# Patient Record
Sex: MALE | Race: WHITE | Employment: FULL TIME | URBAN - METROPOLITAN AREA
[De-identification: names, ages, dates, MRNs, and addresses within clinical notes are randomized per-mention and may not be internally consistent; named-entity substitution may affect disease eponyms.]

---

## 2020-09-25 ENCOUNTER — APPOINTMENT (OUTPATIENT)
Dept: CT IMAGING | Age: 55
End: 2020-09-25
Attending: EMERGENCY MEDICINE
Payer: COMMERCIAL

## 2020-09-25 ENCOUNTER — HOSPITAL ENCOUNTER (EMERGENCY)
Age: 55
Discharge: HOME | End: 2020-09-25
Attending: EMERGENCY MEDICINE
Payer: COMMERCIAL

## 2020-09-25 ENCOUNTER — APPOINTMENT (OUTPATIENT)
Dept: GENERAL RADIOLOGY | Age: 55
End: 2020-09-25
Attending: EMERGENCY MEDICINE
Payer: COMMERCIAL

## 2020-09-25 VITALS
WEIGHT: 260 LBS | HEIGHT: 73 IN | RESPIRATION RATE: 20 BRPM | BODY MASS INDEX: 34.46 KG/M2 | TEMPERATURE: 98 F | HEART RATE: 63 BPM | OXYGEN SATURATION: 99 % | DIASTOLIC BLOOD PRESSURE: 82 MMHG | SYSTOLIC BLOOD PRESSURE: 171 MMHG

## 2020-09-25 DIAGNOSIS — R07.9 CHEST PAIN, UNSPECIFIED TYPE: Primary | ICD-10-CM

## 2020-09-25 DIAGNOSIS — R91.1 PULMONARY NODULE: ICD-10-CM

## 2020-09-25 DIAGNOSIS — R07.89 CHEST WALL PAIN: ICD-10-CM

## 2020-09-25 DIAGNOSIS — K21.9 GASTROESOPHAGEAL REFLUX DISEASE WITHOUT ESOPHAGITIS: ICD-10-CM

## 2020-09-25 LAB
ALBUMIN SERPL-MCNC: 3.8 G/DL (ref 3.5–5)
ALBUMIN/GLOB SERPL: 1.1 {RATIO} (ref 1.1–2.2)
ALP SERPL-CCNC: 98 U/L (ref 45–117)
ALT SERPL-CCNC: 26 U/L (ref 12–78)
ANION GAP SERPL CALC-SCNC: 5 MMOL/L (ref 5–15)
AST SERPL W P-5'-P-CCNC: 13 U/L (ref 15–37)
ATRIAL RATE: 64 BPM
BASOPHILS # BLD: 0 K/UL (ref 0–0.1)
BASOPHILS NFR BLD: 0 % (ref 0–1)
BILIRUB SERPL-MCNC: 0.3 MG/DL (ref 0.2–1)
BNP SERPL-MCNC: 85 PG/ML
BUN SERPL-MCNC: 15 MG/DL (ref 6–20)
BUN/CREAT SERPL: 18 (ref 12–20)
CA-I BLD-MCNC: 9 MG/DL (ref 8.5–10.1)
CALCULATED P AXIS, ECG09: 5 DEGREES
CALCULATED R AXIS, ECG10: 14 DEGREES
CALCULATED T AXIS, ECG11: 38 DEGREES
CHLORIDE SERPL-SCNC: 109 MMOL/L (ref 97–108)
CO2 SERPL-SCNC: 26 MMOL/L (ref 21–32)
CREAT SERPL-MCNC: 0.84 MG/DL (ref 0.7–1.3)
DIAGNOSIS, 93000: NORMAL
DIFFERENTIAL METHOD BLD: NORMAL
EOSINOPHIL # BLD: 0.1 K/UL (ref 0–0.4)
EOSINOPHIL NFR BLD: 2 % (ref 0–7)
ERYTHROCYTE [DISTWIDTH] IN BLOOD BY AUTOMATED COUNT: 13.3 % (ref 11.5–14.5)
GLOBULIN SER CALC-MCNC: 3.5 G/DL (ref 2–4)
GLUCOSE SERPL-MCNC: 110 MG/DL (ref 65–100)
HCT VFR BLD AUTO: 43.1 % (ref 36.6–50.3)
HGB BLD-MCNC: 14.7 % (ref 12.1–17)
IMM GRANULOCYTES # BLD AUTO: 0 K/UL (ref 0–0.04)
IMM GRANULOCYTES NFR BLD AUTO: 0 % (ref 0–0.5)
LYMPHOCYTES # BLD: 1.3 K/UL (ref 0.8–3.5)
LYMPHOCYTES NFR BLD: 18 % (ref 12–49)
MCH RBC QN AUTO: 30.4 PG (ref 26–34)
MCHC RBC AUTO-ENTMCNC: 34.1 G/DL (ref 30–36.5)
MCV RBC AUTO: 89 FL (ref 80–99)
MONOCYTES # BLD: 0.4 K/UL (ref 0–1)
MONOCYTES NFR BLD: 6 % (ref 5–13)
NEUTS SEG # BLD: 5.1 K/UL (ref 1.8–8)
NEUTS SEG NFR BLD: 74 % (ref 32–75)
P-R INTERVAL, ECG05: 146 MS
PLATELET # BLD AUTO: 249 K/UL (ref 150–400)
PMV BLD AUTO: 11.8 FL (ref 8.9–12.9)
POTASSIUM SERPL-SCNC: 3.9 MMOL/L (ref 3.5–5.1)
PROT SERPL-MCNC: 7.3 G/DL (ref 6.4–8.2)
Q-T INTERVAL, ECG07: 422 MS
QRS DURATION, ECG06: 106 MS
QTC CALCULATION (BEZET), ECG08: 435 MS
RBC # BLD AUTO: 4.84 M/UL (ref 4.1–5.7)
SODIUM SERPL-SCNC: 140 MMOL/L (ref 136–145)
TROPONIN I SERPL-MCNC: <0.05 NG/ML
TROPONIN I SERPL-MCNC: <0.05 NG/ML
VENTRICULAR RATE, ECG03: 64 BPM
WBC # BLD AUTO: 6.8 K/UL (ref 4.1–11.1)

## 2020-09-25 PROCEDURE — 83880 ASSAY OF NATRIURETIC PEPTIDE: CPT

## 2020-09-25 PROCEDURE — 80053 COMPREHEN METABOLIC PANEL: CPT

## 2020-09-25 PROCEDURE — 36415 COLL VENOUS BLD VENIPUNCTURE: CPT

## 2020-09-25 PROCEDURE — 93005 ELECTROCARDIOGRAM TRACING: CPT

## 2020-09-25 PROCEDURE — 74011000636 HC RX REV CODE- 636: Performed by: EMERGENCY MEDICINE

## 2020-09-25 PROCEDURE — 99284 EMERGENCY DEPT VISIT MOD MDM: CPT

## 2020-09-25 PROCEDURE — 85025 COMPLETE CBC W/AUTO DIFF WBC: CPT

## 2020-09-25 PROCEDURE — 71275 CT ANGIOGRAPHY CHEST: CPT

## 2020-09-25 PROCEDURE — 71045 X-RAY EXAM CHEST 1 VIEW: CPT

## 2020-09-25 PROCEDURE — 84484 ASSAY OF TROPONIN QUANT: CPT

## 2020-09-25 RX ORDER — FAMOTIDINE 20 MG/1
20 TABLET, FILM COATED ORAL 2 TIMES DAILY
Qty: 20 TAB | Refills: 0 | Status: SHIPPED | OUTPATIENT
Start: 2020-09-25 | End: 2020-10-05

## 2020-09-25 RX ORDER — ASPIRIN 325 MG
325 TABLET ORAL
Status: DISCONTINUED | OUTPATIENT
Start: 2020-09-25 | End: 2020-09-25 | Stop reason: HOSPADM

## 2020-09-25 RX ORDER — TRAMADOL HYDROCHLORIDE 50 MG/1
50 TABLET ORAL
Qty: 12 TAB | Refills: 0 | Status: SHIPPED | OUTPATIENT
Start: 2020-09-25 | End: 2020-09-28

## 2020-09-25 RX ADMIN — IOPAMIDOL 100 ML: 755 INJECTION, SOLUTION INTRAVENOUS at 12:30

## 2020-09-25 NOTE — DISCHARGE INSTRUCTIONS
Patient Education        Chest Pain: Care Instructions  Your Care Instructions     There are many things that can cause chest pain. Some are not serious and will get better on their own in a few days. But some kinds of chest pain need more testing and treatment. Your doctor may have recommended a follow-up visit in the next 8 to 12 hours. If you are not getting better, you may need more tests or treatment. Even though your doctor has released you, you still need to watch for any problems. The doctor carefully checked you, but sometimes problems can develop later. If you have new symptoms or if your symptoms do not get better, get medical care right away. If you have worse or different chest pain or pressure that lasts more than 5 minutes or you passed out (lost consciousness), call 911 or seek other emergency help right away. A medical visit is only one step in your treatment. Even if you feel better, you still need to do what your doctor recommends, such as going to all suggested follow-up appointments and taking medicines exactly as directed. This will help you recover and help prevent future problems. How can you care for yourself at home? · Rest until you feel better. · Take your medicine exactly as prescribed. Call your doctor if you think you are having a problem with your medicine. · Do not drive after taking a prescription pain medicine. When should you call for help? Call 911 if:     · You passed out (lost consciousness).     · You have severe difficulty breathing.     · You have symptoms of a heart attack. These may include:  ? Chest pain or pressure, or a strange feeling in your chest.  ? Sweating. ? Shortness of breath. ? Nausea or vomiting. ? Pain, pressure, or a strange feeling in your back, neck, jaw, or upper belly or in one or both shoulders or arms. ? Lightheadedness or sudden weakness. ? A fast or irregular heartbeat.   After you call 911, the  may tell you to chew 1 adult-strength or 2 to 4 low-dose aspirin. Wait for an ambulance. Do not try to drive yourself. Call your doctor today if:     · You have any trouble breathing.     · Your chest pain gets worse.     · You are dizzy or lightheaded, or you feel like you may faint.     · You are not getting better as expected.     · You are having new or different chest pain. Where can you learn more? Go to http://www.nava.com/  Enter A120 in the search box to learn more about \"Chest Pain: Care Instructions. \"  Current as of: June 26, 2019               Content Version: 12.6  © 5833-1982 [employer]. Care instructions adapted under license by [employer] (which disclaims liability or warranty for this information). If you have questions about a medical condition or this instruction, always ask your healthcare professional. [address] any warranty or liability for your use of this information. Patient Education        Gastroesophageal Reflux Disease (GERD): Care Instructions  Overview     Gastroesophageal reflux disease (GERD) is the backward flow of stomach acid into the esophagus. The esophagus is the tube that leads from your throat to your stomach. A one-way valve prevents the stomach acid from backing up into this tube. But when you have GERD, this valve does not close tightly enough. This can also cause pain and swelling in your esophagus. (This is called esophagitis.)  If you have mild GERD symptoms including heartburn, you may be able to control the problem with antacids or over-the-counter medicine. You can also make lifestyle changes to help reduce your symptoms. These include changing your diet and eating habits, such as not eating late at night and losing weight. Follow-up care is a key part of your treatment and safety. Be sure to make and go to all appointments, and call your doctor if you are having problems.  It's also a good idea to know your test results and keep a list of the medicines you take. How can you care for yourself at home? · Take your medicines exactly as prescribed. Call your doctor if you think you are having a problem with your medicine. · Your doctor may recommend over-the-counter medicine. For mild or occasional indigestion, antacids, such as Tums, Gaviscon, Mylanta, or Maalox, may help. Your doctor also may recommend over-the-counter acid reducers, such as famotidine (Pepcid AC), cimetidine (Tagamet HB), or omeprazole (Prilosec). Read and follow all instructions on the label. If you use these medicines often, talk with your doctor. · Change your eating habits. ? It's best to eat several small meals instead of two or three large meals. ? After you eat, wait 2 to 3 hours before you lie down. ? Chocolate, mint, and alcohol can make GERD worse. ? Spicy foods, foods that have a lot of acid (like tomatoes and oranges), and coffee can make GERD symptoms worse in some people. If your symptoms are worse after you eat a certain food, you may want to stop eating that food to see if your symptoms get better. · Do not smoke or chew tobacco. Smoking can make GERD worse. If you need help quitting, talk to your doctor about stop-smoking programs and medicines. These can increase your chances of quitting for good. · If you have GERD symptoms at night, raise the head of your bed 6 to 8 inches by putting the frame on blocks or placing a foam wedge under the head of your mattress. (Adding extra pillows does not work.)  · Do not wear tight clothing around your middle. · Lose weight if you need to. Losing just 5 to 10 pounds can help. When should you call for help? Call your doctor now or seek immediate medical care if:    · You have new or different belly pain.     · Your stools are black and tarlike or have streaks of blood.    Watch closely for changes in your health, and be sure to contact your doctor if:    · Your symptoms have not improved after 2 days.     · Food seems to catch in your throat or chest.   Where can you learn more? Go to http://tima-radha.info/  Enter N776 in the search box to learn more about \"Gastroesophageal Reflux Disease (GERD): Care Instructions. \"  Current as of: April 15, 2020               Content Version: 12.6  © 3239-0296 [employer]. Care instructions adapted under license by [employer] (which disclaims liability or warranty for this information). If you have questions about a medical condition or this instruction, always ask your healthcare professional. [place] [address] any warranty or liability for your use of this information. Patient Education        Learning About Lung Nodules  What is a lung nodule? A lung nodule is a growth in the lung. A single nodule surrounded by lung tissue is called a solitary pulmonary nodule. A lung nodule might not cause any symptoms. Your doctor may have found one or more nodules on your lung when you were having a chest X-ray or CT scan. Or it may have been found during a lung cancer screening. A lung nodule may be caused by an old infection or cancer. It might also be a noncancerous growth. Lung nodules can cause a screening to give an abnormal result. Most nodules do not cause any harm. But without further tests, your doctor can't tell whether an abnormal finding is cancer, a harmless nodule, or something else. What can you expect when you have a lung nodule? Your doctor will look at several risk factors to see how likely it is that the nodule is cancer. He or she will look at:  · Whether you smoke or have ever smoked. · Your age and your family's medical history. · Whether you have ever had lung cancer. · The size, density, and other characteristics of the nodule. · Whether the nodule has changed in size.  Your doctor may look at past chest X-rays or CT scans, if available, and compare them. Or you may have a series of CT scans to see if the nodule grows over time. What happens next depends on the risk of the nodule being cancer. · If you have no risk factors and the nodule is small, your doctor may advise doing nothing. · If the risk is small, your doctor may schedule follow-up appointments and CT scans later to see if the nodule is growing. · If there's a higher risk of cancer, your doctor may:  ? Do a PET scan, which may help tell if the nodule is cancerous or not. ? Take a sample of tissue from the nodule for testing. This is called a biopsy. ? Remove the nodule with surgery. Follow-up care is a key part of your treatment and safety. Be sure to make and go to all appointments, and call your doctor if you are having problems. It's also a good idea to know your test results and keep a list of the medicines you take. Where can you learn more? Go to http://www.gray.com/  Enter G449 in the search box to learn more about \"Learning About Lung Nodules. \"  Current as of: April 29, 2020               Content Version: 12.6  © 3009-0270 [employer]. Care instructions adapted under license by [employer] (which disclaims liability or warranty for this information). If you have questions about a medical condition or this instruction, always ask your healthcare professional. [address] any warranty or liability for your use of this information.

## 2020-09-25 NOTE — ED PROVIDER NOTES
EMERGENCY DEPARTMENT HISTORY AND PHYSICAL EXAM      Date: 9/25/2020  Patient Name: Robert Lira. History of Presenting Illness     Chief Complaint   Patient presents with    Chest Pain       History Provided By: Patient    HPI: Robert Gray, 50 y.o. male with a past medical history significant hypertension and myocardial infarction presents to the ED with chief complaint of Chest Pain  . Patient's been having order chest pain and substernal chest burning discomfort. The burning discomfort happens only when he exerts himself. He has been having these intermittent episodes of the last 2 weeks. They seem to be worsening not necessary in intensity or severity. He cannot qualify why it is worsening. He has been compliant on his medication. Take 800 mg of aspirin this morning. No leg pain or swelling. No nausea or vomiting. Denies any discomfort now as he does not want medications          There are no other complaints, changes, or physical findings at this time. PCP: Claudio Jimenez MD    Current Facility-Administered Medications   Medication Dose Route Frequency Provider Last Rate Last Dose    aspirin tablet 325 mg  325 mg Oral NOW Karlene Joseph MD         Current Outpatient Medications   Medication Sig Dispense Refill    traMADoL (Ultram) 50 mg tablet Take 1 Tab by mouth every six (6) hours as needed for Pain for up to 3 days. Max Daily Amount: 200 mg. 12 Tab 0    famotidine (Pepcid) 20 mg tablet Take 1 Tab by mouth two (2) times a day for 10 days. 20 Tab 0       Past History     Past Medical History:  Past Medical History:   Diagnosis Date    CAD (coronary artery disease)     Hypertension        Past Surgical History:  History reviewed. No pertinent surgical history. Family History:  History reviewed. No pertinent family history.     Social History:  Social History     Tobacco Use    Smoking status: Current Every Day Smoker    Smokeless tobacco: Never Used   Substance Use Topics  Alcohol use: Not on file    Drug use: Not on file       Allergies:  No Known Allergies      Review of Systems   Review of Systems   Constitutional: Negative. Negative for chills, fatigue and fever. HENT: Negative. Negative for congestion, ear pain, nosebleeds and sore throat. Eyes: Negative. Negative for pain, discharge and visual disturbance. Respiratory: Positive for chest tightness. Negative for cough and shortness of breath. Cardiovascular: Positive for chest pain. Negative for leg swelling. Gastrointestinal: Negative. Negative for abdominal pain, blood in stool, constipation, diarrhea, nausea and vomiting. Endocrine: Negative. Genitourinary: Negative. Negative for difficulty urinating, dysuria and flank pain. Musculoskeletal: Negative. Negative for back pain and myalgias. Skin: Negative. Negative for rash and wound. Allergic/Immunologic: Negative. Neurological: Negative. Negative for dizziness, syncope, weakness, numbness and headaches. Hematological: Negative. Does not bruise/bleed easily. Psychiatric/Behavioral: Negative. Negative for agitation, confusion, hallucinations and suicidal ideas. All other systems reviewed and are negative. Physical Exam   Physical Exam  Vitals signs and nursing note reviewed. Constitutional:       General: He is not in acute distress. Appearance: He is normal weight. He is not ill-appearing. HENT:      Head: Normocephalic and atraumatic. Right Ear: External ear normal.      Left Ear: External ear normal.      Nose: Nose normal. No rhinorrhea. Mouth/Throat:      Mouth: Mucous membranes are moist.      Pharynx: Oropharynx is clear. Eyes:      Extraocular Movements: Extraocular movements intact. Conjunctiva/sclera: Conjunctivae normal.      Pupils: Pupils are equal, round, and reactive to light. Neck:      Musculoskeletal: Normal range of motion and neck supple.    Cardiovascular:      Rate and Rhythm: Normal rate and regular rhythm. Pulses: Normal pulses. Heart sounds: Normal heart sounds. Pulmonary:      Effort: Pulmonary effort is normal. No respiratory distress. Breath sounds: Normal breath sounds. Abdominal:      General: Abdomen is flat. Bowel sounds are normal.      Palpations: Abdomen is soft. Musculoskeletal: Normal range of motion. General: No tenderness or deformity. Skin:     General: Skin is warm and dry. Capillary Refill: Capillary refill takes less than 2 seconds. Findings: No bruising, lesion or rash. Neurological:      General: No focal deficit present. Mental Status: He is alert and oriented to person, place, and time. Mental status is at baseline. Psychiatric:         Mood and Affect: Mood normal.         Behavior: Behavior normal.         Thought Content:  Thought content normal.         Judgment: Judgment normal.         Diagnostic Study Results     Labs -     Recent Results (from the past 12 hour(s))   EKG, 12 LEAD, INITIAL    Collection Time: 09/25/20  7:37 AM   Result Value Ref Range    Ventricular Rate 64 BPM    Atrial Rate 64 BPM    P-R Interval 146 ms    QRS Duration 106 ms    Q-T Interval 422 ms    QTC Calculation (Bezet) 435 ms    Calculated P Axis 5 degrees    Calculated R Axis 14 degrees    Calculated T Axis 38 degrees    Diagnosis       Normal sinus rhythm  Normal ECG  No previous ECGs available  Confirmed by Tramaine Gallego ([id number]) on 9/25/2020 8:11:48 AM     CBC WITH AUTOMATED DIFF    Collection Time: 09/25/20  8:15 AM   Result Value Ref Range    WBC 6.8 4.1 - 11.1 K/uL    RBC 4.84 4.10 - 5.70 M/uL    HGB 14.7 12.1 - 17.0 %    HCT 43.1 36.6 - 50.3 %    MCV 89.0 80.0 - 99.0 FL    MCH 30.4 26.0 - 34.0 PG    MCHC 34.1 30.0 - 36.5 g/dL    RDW 13.3 11.5 - 14.5 %    PLATELET 431 733 - 489 K/uL    MPV 11.8 8.9 - 12.9 FL    NEUTROPHILS 74 32 - 75 %    LYMPHOCYTES 18 12 - 49 %    MONOCYTES 6 5 - 13 %    EOSINOPHILS 2 0 - 7 %    BASOPHILS 0 0 - 1 %    IMMATURE GRANULOCYTES 0 0.0 - 0.5 %    ABS. NEUTROPHILS 5.1 1.8 - 8.0 K/UL    ABS. LYMPHOCYTES 1.3 0.8 - 3.5 K/UL    ABS. MONOCYTES 0.4 0.0 - 1.0 K/UL    ABS. EOSINOPHILS 0.1 0.0 - 0.4 K/UL    ABS. BASOPHILS 0.0 0.0 - 0.1 K/UL    ABS. IMM. GRANS. 0.0 0.00 - 0.04 K/UL    DF AUTOMATED     METABOLIC PANEL, COMPREHENSIVE    Collection Time: 09/25/20  8:15 AM   Result Value Ref Range    Sodium 140 136 - 145 mmol/L    Potassium 3.9 3.5 - 5.1 mmol/L    Chloride 109 (H) 97 - 108 mmol/L    CO2 26 21 - 32 mmol/L    Anion gap 5 5 - 15 mmol/L    Glucose 110 (H) 65 - 100 mg/dL    BUN 15 6 - 20 mg/dL    Creatinine 0.84 0.70 - 1.30 mg/dL    BUN/Creatinine ratio 18 12 - 20      GFR est AA >60 >60 ml/min/1.73m2    GFR est non-AA >60 >60 ml/min/1.73m2    Calcium 9.0 8.5 - 10.1 mg/dL    Bilirubin, total 0.3 0.2 - 1.0 mg/dL    AST (SGOT) 13 (L) 15 - 37 U/L    ALT (SGPT) 26 12 - 78 U/L    Alk. phosphatase 98 45 - 117 U/L    Protein, total 7.3 6.4 - 8.2 g/dL    Albumin 3.8 3.5 - 5.0 g/dL    Globulin 3.5 2.0 - 4.0 g/dL    A-G Ratio 1.1 1.1 - 2.2     TROPONIN I    Collection Time: 09/25/20  8:15 AM   Result Value Ref Range    Troponin-I, Qt. <0.05 <0.05 ng/mL   BNP    Collection Time: 09/25/20  8:15 AM   Result Value Ref Range    NT pro-BNP 85 <125 pg/mL   TROPONIN I    Collection Time: 09/25/20 10:35 AM   Result Value Ref Range    Troponin-I, Qt. <0.05 <0.05 ng/mL       Radiologic Studies -   CTA CHEST W OR W WO CONT   Final Result   IMPRESSION:   1. No findings of pulmonary embolism. 2. No findings of acute cardiopulmonary abnormality. 3. Incidental 2 mm pulmonary nodule. Consider follow-up chest CT in 12 months. 4. Prominent lymph nodes within the upper abdomen are partially imaged and   incompletely evaluated. Further evaluation of the abdomen and pelvis with   nonemergent contrast enhanced CT is recommended. XR CHEST SNGL V   Final Result   IMPRESSION:   1. No findings of acute cardiopulmonary abnormality.         CT Results  (Last 48 hours)               09/25/20 1239  CTA CHEST W OR W WO CONT Final result    Impression:  IMPRESSION:   1. No findings of pulmonary embolism. 2. No findings of acute cardiopulmonary abnormality. 3. Incidental 2 mm pulmonary nodule. Consider follow-up chest CT in 12 months. 4. Prominent lymph nodes within the upper abdomen are partially imaged and   incompletely evaluated. Further evaluation of the abdomen and pelvis with   nonemergent contrast enhanced CT is recommended. Narrative:  Exam: CTA CHEST W OR W WO CONT       TECHNIQUE: Multiple transaxial CT images of the chest were obtained following   the uneventful administration of 100 mL Isovue 370 intravenous contrast. Coronal   and sagittal reformatted images were provided. MIP reconstructions were also   performed. Dose reduction: All CT scans at this facility are performed using dose reduction   optimization techniques as appropriate to a performed exam including the   following: Automated exposure control, adjustments of the mA and/or kV according   to patient size, or use of iterative reconstruction technique. COMPARISON: Chest radiograph 9/25/2020       HISTORY: CTA r/o PE       FINDINGS:       There is some mixing artifact of the contrast involving the segmental pulmonary   arteries extending distally. In the diagnostic opacified portions of the   pulmonary arteries there are no findings of pulmonary embolism. The pulmonary   arteries are normal in size. There is pulsatility artifact of the thoracic aorta. Within exam limitations are   no findings of acute aortic abnormality. There is mild atherosclerosis of the   thoracic aorta. Atherosclerotic calcifications of the coronary vasculature. Heart size is normal without pericardial effusion. No bulky mediastinal or hilar   lymphadenopathy. Imaged portions of the thyroid appear unremarkable.  The   intrathoracic esophagus is decompressed, limiting further evaluation. Central airways appear patent. Detailed evaluation of the pulmonary parenchyma   is somewhat limited by respiratory motion. There is no focal airspace   consolidation, pneumothorax, or pleural effusion evident. There is minimal   dependent atelectasis bilaterally. There is a 2 mm solid nodule within the right   upper lobe (series 506 image 30). There are prominent lymph nodes within the upper abdomen, including adjacent to   the pancreas measuring up to 10 mm in short axis dimension. Remaining visualized   upper abdominal contents appear unremarkable. There is multilevel degenerative disease of the spine. No findings of acute or   aggressive osseous abnormality. No suspicious superficial soft tissue   abnormalities. CXR Results  (Last 48 hours)               09/25/20 0808  XR CHEST SNGL V Final result    Impression:  IMPRESSION:   1. No findings of acute cardiopulmonary abnormality. Narrative:  Examination: XR CHEST SNGL V        History: cp       Comparison: Portable chest radiograph December 18, 2013       FINDINGS:       Single frontal portable view of the chest. Elevated left hemidiaphragm. This is   similar to prior. No focal airspace consolidation, pneumothorax, or pleural   effusion. The cardiac silhouette is prominent, unchanged. The mediastinal   contours otherwise appear prominent, likely accentuated by portable technique   and low lung volumes. Within exam limitations there are no findings to confirm   acute mediastinal abnormality. No findings of acute or aggressive osseous   abnormality. Medical Decision Making and ED Course   I am the first provider for this patient. I reviewed the vital signs, available nursing notes, past medical history, past surgical history, family history and social history. Vital Signs-Reviewed the patient's vital signs.   Patient Vitals for the past 12 hrs:   Temp Pulse Resp BP SpO2   09/25/20 0838 -- -- -- -- 100 %   09/25/20 0836 -- 63 15 (!) 152/93 99 %   09/25/20 0739 98.5 °F (36.9 °C) 68 18 (!) 183/93 99 %       EKG interpretation:   737a EKG interpretation:  Normal sinus rhythm. Rate 64. No ST segment changes. No T wave Inversions. Normal Intervals. Interpreted by ED physician. Reason rule out dysarrythmia        Records Reviewed: Previous Hospital chart. EMS run report      ED Course:   Initial assessment performed. The patients presenting problems have been discussed, and they are in agreement with the care plan formulated and outlined with them. I have encouraged them to ask questions as they arise throughout their visit. Orders Placed This Encounter    XR CHEST SNGL V     Standing Status:   Standing     Number of Occurrences:   1     Order Specific Question:   Transport     Answer:   Stretcher [5]     Order Specific Question:   Reason for Exam     Answer:   cp    CTA CHEST W OR W WO CONT     Standing Status:   Standing     Number of Occurrences:   1     Order Specific Question:   Transport     Answer:   Stretcher [5]     Order Specific Question:   Reason for Exam     Answer:   CTA r/o PE     Order Specific Question:   Does patient have history of Renal Disease?      Answer:   No    CBC WITH AUTOMATED DIFF     Standing Status:   Standing     Number of Occurrences:   1    METABOLIC PANEL, COMPREHENSIVE     Standing Status:   Standing     Number of Occurrences:   1    TROPONIN I     Standing Status:   Standing     Number of Occurrences:   1    BNP     Standing Status:   Standing     Number of Occurrences:   1    URINALYSIS W/ REFLEX CULTURE     Standing Status:   Standing     Number of Occurrences:   1    TROPONIN I     Standing Status:   Standing     Number of Occurrences:   1    EKG 12 LEAD INITIAL     Standing Status:   Standing     Number of Occurrences:   1     Order Specific Question:   Reason for Exam:     Answer:   cp    aspirin tablet 325 mg    iopamidoL (ISOVUE-370) 76 % injection 100 mL  traMADoL (Ultram) 50 mg tablet     Sig: Take 1 Tab by mouth every six (6) hours as needed for Pain for up to 3 days. Max Daily Amount: 200 mg. Dispense:  12 Tab     Refill:  0    famotidine (Pepcid) 20 mg tablet     Sig: Take 1 Tab by mouth two (2) times a day for 10 days. Dispense:  20 Tab     Refill:  0       HEART SCORE:     History (slight suspicious 0, moderate +1, highly suspicious +2)  0  EKG (normal 0, nonspecific repol changes +1, ST changes +2)  0  Age (<45 y 0, 45-64y +1, >65y +2)  1  Risk Factors HTN, XOL, DM, obesity, smoker, CAD, PAD (none 0, 1-2 factors +1, >3 +2 factors) 3  Troponin (normal 0, 1-3x limit +1, >3x limit +2)  0    Heart Score 4        Management   Scores 0-3: 0.9-1.7% risk of adverse cardiac event. In the HEART Score study, these patients were discharged (0.99% in the retrospective study, 1.7% in the prospective study)   Scores 4-6: 12-16.6% risk of adverse cardiac event. In the HEART Score study, these patients were admitted to the hospital. (11.6% retrospective, 16.6% prospective)   Scores ? 7: 50-65% risk of adverse cardiac event. In the HEART Score study, these patients were candidates for early invasive measures. (65.2% retrospective, 50.1% prospective)   A MACE (Major Adverse Cardiac Event) was defined as all-cause mortality, myocardial infarction, or coronary revascularization. Original/Primary Reference  · Hannah Paniagua Kelder JC. Chest pain in the emergency room: value of the HEART score. Neth Heart J. 2008;16(6):191-6. Validation  · Izaiah Ceron, Oksana WAGNER, et al. A prospective validation of the HEART score for chest pain patients at the emergency department. Int J Cardiol. 2013;168(3):2153-8. · Izaiah Ceron, Saleem Bloom et al. Chest pain in the emergency room: a multicenter validation of the HEART Score. Crit Pathw Cardiol. 2010;9(3):164-9.   · David SCHULTZ, Lalo LEI, Aaron LANGE, et al. The HEART Pathway Randomized Controlled Trial One Year Outcomes. Acad Emerg Med. 2018;              CONSULTANTS:      Provider Notes (Medical Decision Making):   53-year-old male with a history of cardiac disease complaining of chest pain lateral chest and substernal different characteristics for 2 weeks. Plan to rule out cardiac enzymes. CTA of the chest to rule out PE or dissection. Patient does not want to stay and wants to follow-up with his primary care physician and cardiologist.  Patient is stable to do so. Procedures                       Disposition       Emergency Department Disposition:  Home      DISCHARGE PLAN:    Patient is discharged home. Discharge instructions provided. Patient is stable and improved at time of disposition. Vitals are stable. 1.   Current Discharge Medication List      START taking these medications    Details   traMADoL (Ultram) 50 mg tablet Take 1 Tab by mouth every six (6) hours as needed for Pain for up to 3 days. Max Daily Amount: 200 mg. Qty: 12 Tab, Refills: 0    Associated Diagnoses: Chest pain, unspecified type; Chest wall pain      famotidine (Pepcid) 20 mg tablet Take 1 Tab by mouth two (2) times a day for 10 days. Qty: 20 Tab, Refills: 0           2. Follow-up Information     Follow up With Specialties Details Why Contact Info    Sacha Garcia, [address]   [address] [place] [address]  [phone]          3. Return to ED if worse     Pt voiced they understand they plan and do not have questions at this time        Diagnosis     Clinical Impression:   1. Chest pain, unspecified type    2. Pulmonary nodule    3. Gastroesophageal reflux disease without esophagitis    4. Chest wall pain        Attestations:    Chong Delgado MD    Please note that this dictation was completed with [employer]idient, the computer voice recognition software. Quite often unanticipated grammatical, syntax, homophones, and other interpretive errors are inadvertently transcribed by the computer software.   Please disregard these errors. Please excuse any errors that have escaped final proofreading. Thank you.

## 2020-10-05 ENCOUNTER — HOSPITAL ENCOUNTER (OUTPATIENT)
Dept: PREADMISSION TESTING | Age: 55
Discharge: HOME | End: 2020-10-05
Payer: COMMERCIAL

## 2020-10-05 LAB
ALBUMIN SERPL-MCNC: 4 G/DL (ref 3.5–5)
ALBUMIN/GLOB SERPL: 1.1 {RATIO} (ref 1.1–2.2)
ALP SERPL-CCNC: 103 U/L (ref 45–117)
ALT SERPL-CCNC: 24 U/L (ref 12–78)
ANION GAP SERPL CALC-SCNC: 8 MMOL/L (ref 5–15)
APPEARANCE UR: CLEAR
APTT PPP: 27.8 SEC (ref 23–35.7)
AST SERPL W P-5'-P-CCNC: 10 U/L (ref 15–37)
BACTERIA URNS QL MICRO: NEGATIVE /HPF
BILIRUB SERPL-MCNC: 0.4 MG/DL (ref 0.2–1)
BILIRUB UR QL: NEGATIVE
BUN SERPL-MCNC: 16 MG/DL (ref 6–20)
BUN/CREAT SERPL: 20 (ref 12–20)
CA-I BLD-MCNC: 9 MG/DL (ref 8.5–10.1)
CHLORIDE SERPL-SCNC: 107 MMOL/L (ref 97–108)
CO2 SERPL-SCNC: 25 MMOL/L (ref 21–32)
COLOR UR: NORMAL
CREAT SERPL-MCNC: 0.79 MG/DL (ref 0.7–1.3)
ERYTHROCYTE [DISTWIDTH] IN BLOOD BY AUTOMATED COUNT: 13.4 % (ref 11.5–14.5)
GLOBULIN SER CALC-MCNC: 3.6 G/DL (ref 2–4)
GLUCOSE SERPL-MCNC: 82 MG/DL (ref 65–100)
GLUCOSE UR STRIP.AUTO-MCNC: NEGATIVE MG/DL
HCT VFR BLD AUTO: 42 % (ref 36.6–50.3)
HGB BLD-MCNC: 14.8 G/DL (ref 12.1–17)
HGB UR QL STRIP: NEGATIVE
INR PPP: 1.1 (ref 0.9–1.1)
KETONES UR QL STRIP.AUTO: NEGATIVE MG/DL
LEUKOCYTE ESTERASE UR QL STRIP.AUTO: NEGATIVE
MCH RBC QN AUTO: 31.2 PG (ref 26–34)
MCHC RBC AUTO-ENTMCNC: 35.2 G/DL (ref 30–36.5)
MCV RBC AUTO: 88.4 FL (ref 80–99)
MUCOUS THREADS URNS QL MICRO: NORMAL /LPF
NITRITE UR QL STRIP.AUTO: NEGATIVE
PH UR STRIP: 5 [PH] (ref 5–8)
PLATELET # BLD AUTO: 216 K/UL (ref 150–400)
PMV BLD AUTO: 11 FL (ref 8.9–12.9)
POTASSIUM SERPL-SCNC: 3.8 MMOL/L (ref 3.5–5.1)
PROT SERPL-MCNC: 7.6 G/DL (ref 6.4–8.2)
PROT UR STRIP-MCNC: NEGATIVE MG/DL
PROTHROMBIN TIME: 14.1 SEC (ref 11.9–14.7)
RBC # BLD AUTO: 4.75 M/UL (ref 4.1–5.7)
RBC #/AREA URNS HPF: NORMAL /HPF (ref 0–5)
SODIUM SERPL-SCNC: 140 MMOL/L (ref 136–145)
SP GR UR REFRACTOMETRY: 1.01 (ref 1–1.03)
THERAPEUTIC RANGE,PTTT: NORMAL SEC (ref 68–109)
UROBILINOGEN UR QL STRIP.AUTO: 0.1 EU/DL (ref 0.1–1)
WBC # BLD AUTO: 8.1 K/UL (ref 4.1–11.1)
WBC URNS QL MICRO: NORMAL /HPF (ref 0–4)

## 2020-10-05 PROCEDURE — 36415 COLL VENOUS BLD VENIPUNCTURE: CPT

## 2020-10-05 PROCEDURE — 80061 LIPID PANEL: CPT

## 2020-10-05 PROCEDURE — 87635 SARS-COV-2 COVID-19 AMP PRB: CPT

## 2020-10-05 PROCEDURE — 85610 PROTHROMBIN TIME: CPT

## 2020-10-05 PROCEDURE — 85730 THROMBOPLASTIN TIME PARTIAL: CPT

## 2020-10-05 PROCEDURE — 81001 URINALYSIS AUTO W/SCOPE: CPT

## 2020-10-05 PROCEDURE — 80053 COMPREHEN METABOLIC PANEL: CPT

## 2020-10-05 PROCEDURE — 85027 COMPLETE CBC AUTOMATED: CPT

## 2020-10-05 RX ORDER — ASPIRIN 81 MG/1
81 TABLET ORAL DAILY
COMMUNITY

## 2020-10-05 RX ORDER — BUPROPION HYDROCHLORIDE 150 MG/1
150 TABLET, EXTENDED RELEASE ORAL 2 TIMES DAILY
COMMUNITY
End: 2022-08-27

## 2020-10-05 RX ORDER — ISOSORBIDE MONONITRATE 30 MG/1
30 TABLET, EXTENDED RELEASE ORAL DAILY
COMMUNITY
End: 2022-08-27

## 2020-10-05 RX ORDER — SIMVASTATIN 40 MG/1
TABLET, FILM COATED ORAL
COMMUNITY
End: 2022-08-30

## 2020-10-05 RX ORDER — CLOPIDOGREL BISULFATE 75 MG/1
75 TABLET ORAL DAILY
COMMUNITY

## 2020-10-06 LAB
CHOLEST SERPL-MCNC: 180 MG/DL
HDLC SERPL-MCNC: 43 MG/DL
HDLC SERPL: 4.2 {RATIO} (ref 0–5)
LDLC SERPL CALC-MCNC: 81 MG/DL (ref 0–100)
LIPID PROFILE,FLP: ABNORMAL
SARS-COV-2, COV2: NORMAL
TRIGL SERPL-MCNC: 280 MG/DL (ref ?–150)
VLDLC SERPL CALC-MCNC: 56 MG/DL

## 2020-10-07 NOTE — PERIOP NOTES
Called Dr. Cuba Estrada office spoke to Leslie Henderson RN made aware Triglycerides results 280 stated to make Dr. Janene Porter aware

## 2020-10-08 LAB — SARS-COV-2, COV2NT: NOT DETECTED

## 2020-10-09 ENCOUNTER — HOSPITAL ENCOUNTER (OUTPATIENT)
Age: 55
Discharge: HOME | End: 2020-10-09
Attending: INTERNAL MEDICINE | Admitting: INTERNAL MEDICINE
Payer: COMMERCIAL

## 2020-10-09 VITALS
TEMPERATURE: 97.6 F | HEIGHT: 73 IN | OXYGEN SATURATION: 99 % | BODY MASS INDEX: 38.04 KG/M2 | DIASTOLIC BLOOD PRESSURE: 64 MMHG | RESPIRATION RATE: 16 BRPM | HEART RATE: 68 BPM | WEIGHT: 287 LBS | SYSTOLIC BLOOD PRESSURE: 139 MMHG

## 2020-10-09 DIAGNOSIS — R07.9 CHEST PAIN, UNSPECIFIED TYPE: ICD-10-CM

## 2020-10-09 DIAGNOSIS — R94.39 ABNORMAL CARDIOVASCULAR STRESS TEST: ICD-10-CM

## 2020-10-09 LAB
ATRIAL RATE: 59 BPM
CALCULATED P AXIS, ECG09: 3 DEGREES
CALCULATED R AXIS, ECG10: 7 DEGREES
CALCULATED T AXIS, ECG11: -3 DEGREES
DIAGNOSIS, 93000: NORMAL
P-R INTERVAL, ECG05: 152 MS
Q-T INTERVAL, ECG07: 436 MS
QRS DURATION, ECG06: 108 MS
QTC CALCULATION (BEZET), ECG08: 431 MS
VENTRICULAR RATE, ECG03: 59 BPM

## 2020-10-09 PROCEDURE — 74011000636 HC RX REV CODE- 636: Performed by: INTERNAL MEDICINE

## 2020-10-09 PROCEDURE — C1874 STENT, COATED/COV W/DEL SYS: HCPCS | Performed by: INTERNAL MEDICINE

## 2020-10-09 PROCEDURE — 77030013516 HC DEV INFL ANGI MRTM -B: Performed by: INTERNAL MEDICINE

## 2020-10-09 PROCEDURE — 77030016699 HC CATH ANGI DX INFN1 CARD -A: Performed by: INTERNAL MEDICINE

## 2020-10-09 PROCEDURE — 77030015766: Performed by: INTERNAL MEDICINE

## 2020-10-09 PROCEDURE — 74011250636 HC RX REV CODE- 250/636: Performed by: INTERNAL MEDICINE

## 2020-10-09 PROCEDURE — C1887 CATHETER, GUIDING: HCPCS | Performed by: INTERNAL MEDICINE

## 2020-10-09 PROCEDURE — 74011250637 HC RX REV CODE- 250/637: Performed by: INTERNAL MEDICINE

## 2020-10-09 PROCEDURE — 77030025703 HC SYR ANGI VACLOK MRTM -A: Performed by: INTERNAL MEDICINE

## 2020-10-09 PROCEDURE — 77030008814 HC VLV ACC PLUS MRTM -B: Performed by: INTERNAL MEDICINE

## 2020-10-09 PROCEDURE — 77030008542 HC TBNG MON PRSS EDWD -A: Performed by: INTERNAL MEDICINE

## 2020-10-09 PROCEDURE — 2709999900 HC NON-CHARGEABLE SUPPLY: Performed by: INTERNAL MEDICINE

## 2020-10-09 PROCEDURE — 74011000250 HC RX REV CODE- 250: Performed by: INTERNAL MEDICINE

## 2020-10-09 PROCEDURE — 99153 MOD SED SAME PHYS/QHP EA: CPT | Performed by: INTERNAL MEDICINE

## 2020-10-09 PROCEDURE — 99152 MOD SED SAME PHYS/QHP 5/>YRS: CPT | Performed by: INTERNAL MEDICINE

## 2020-10-09 PROCEDURE — 77030029997 HC DEV COM RDL R BND TELE -B: Performed by: INTERNAL MEDICINE

## 2020-10-09 PROCEDURE — 93005 ELECTROCARDIOGRAM TRACING: CPT

## 2020-10-09 PROCEDURE — 92928 PRQ TCAT PLMT NTRAC ST 1 LES: CPT | Performed by: INTERNAL MEDICINE

## 2020-10-09 PROCEDURE — 76210000000 HC OR PH I REC 2 TO 2.5 HR: Performed by: INTERNAL MEDICINE

## 2020-10-09 PROCEDURE — 76210000027 HC REC RM PH II 3.5 TO 4 HR: Performed by: INTERNAL MEDICINE

## 2020-10-09 PROCEDURE — 93458 L HRT ARTERY/VENTRICLE ANGIO: CPT | Performed by: INTERNAL MEDICINE

## 2020-10-09 PROCEDURE — C1769 GUIDE WIRE: HCPCS | Performed by: INTERNAL MEDICINE

## 2020-10-09 PROCEDURE — C1894 INTRO/SHEATH, NON-LASER: HCPCS | Performed by: INTERNAL MEDICINE

## 2020-10-09 PROCEDURE — C1725 CATH, TRANSLUMIN NON-LASER: HCPCS | Performed by: INTERNAL MEDICINE

## 2020-10-09 DEVICE — STENT COR DES 3.50X12MM -- DES RESOLUTE ONYX: Type: IMPLANTABLE DEVICE | Status: FUNCTIONAL

## 2020-10-09 DEVICE — STENT COR DES 3.50X15MM -- DES RESOLUTE ONYX: Type: IMPLANTABLE DEVICE | Status: FUNCTIONAL

## 2020-10-09 RX ORDER — MIDAZOLAM HYDROCHLORIDE 1 MG/ML
INJECTION, SOLUTION INTRAMUSCULAR; INTRAVENOUS AS NEEDED
Status: DISCONTINUED | OUTPATIENT
Start: 2020-10-09 | End: 2020-10-09 | Stop reason: HOSPADM

## 2020-10-09 RX ORDER — FENTANYL CITRATE 50 UG/ML
INJECTION, SOLUTION INTRAMUSCULAR; INTRAVENOUS AS NEEDED
Status: DISCONTINUED | OUTPATIENT
Start: 2020-10-09 | End: 2020-10-09 | Stop reason: HOSPADM

## 2020-10-09 RX ORDER — HEPARIN SODIUM 1000 [USP'U]/ML
INJECTION, SOLUTION INTRAVENOUS; SUBCUTANEOUS AS NEEDED
Status: DISCONTINUED | OUTPATIENT
Start: 2020-10-09 | End: 2020-10-09 | Stop reason: HOSPADM

## 2020-10-09 RX ORDER — HEPARIN SODIUM 200 [USP'U]/100ML
INJECTION, SOLUTION INTRAVENOUS
Status: COMPLETED | OUTPATIENT
Start: 2020-10-09 | End: 2020-10-09

## 2020-10-09 RX ORDER — VERAPAMIL HYDROCHLORIDE 2.5 MG/ML
INJECTION, SOLUTION INTRAVENOUS AS NEEDED
Status: DISCONTINUED | OUTPATIENT
Start: 2020-10-09 | End: 2020-10-09 | Stop reason: HOSPADM

## 2020-10-09 RX ORDER — CLOPIDOGREL 300 MG/1
TABLET, FILM COATED ORAL AS NEEDED
Status: DISCONTINUED | OUTPATIENT
Start: 2020-10-09 | End: 2020-10-09 | Stop reason: HOSPADM

## 2020-10-09 RX ORDER — LIDOCAINE HYDROCHLORIDE 10 MG/ML
INJECTION INFILTRATION; PERINEURAL AS NEEDED
Status: DISCONTINUED | OUTPATIENT
Start: 2020-10-09 | End: 2020-10-09 | Stop reason: HOSPADM

## 2020-10-09 RX ORDER — SODIUM CHLORIDE 0.9 % (FLUSH) 0.9 %
5-40 SYRINGE (ML) INJECTION EVERY 8 HOURS
Status: DISCONTINUED | OUTPATIENT
Start: 2020-10-09 | End: 2020-10-09 | Stop reason: HOSPADM

## 2020-10-09 RX ORDER — GUAIFENESIN 100 MG/5ML
81 LIQUID (ML) ORAL DAILY
Status: DISCONTINUED | OUTPATIENT
Start: 2020-10-09 | End: 2020-10-09 | Stop reason: HOSPADM

## 2020-10-09 RX ORDER — SODIUM CHLORIDE 0.9 % (FLUSH) 0.9 %
5-40 SYRINGE (ML) INJECTION AS NEEDED
Status: DISCONTINUED | OUTPATIENT
Start: 2020-10-09 | End: 2020-10-09 | Stop reason: HOSPADM

## 2020-10-09 RX ORDER — HYDROCODONE BITARTRATE AND ACETAMINOPHEN 5; 325 MG/1; MG/1
1 TABLET ORAL
Status: DISCONTINUED | OUTPATIENT
Start: 2020-10-09 | End: 2020-10-09 | Stop reason: HOSPADM

## 2020-10-09 RX ORDER — ACETAMINOPHEN 325 MG/1
650 TABLET ORAL
Status: DISCONTINUED | OUTPATIENT
Start: 2020-10-09 | End: 2020-10-09 | Stop reason: HOSPADM

## 2020-10-09 RX ORDER — NITROGLYCERIN 5 MG/ML
INJECTION, SOLUTION INTRAVENOUS AS NEEDED
Status: DISCONTINUED | OUTPATIENT
Start: 2020-10-09 | End: 2020-10-09 | Stop reason: HOSPADM

## 2020-10-09 RX ORDER — ASPIRIN 325 MG
TABLET ORAL AS NEEDED
Status: DISCONTINUED | OUTPATIENT
Start: 2020-10-09 | End: 2020-10-09 | Stop reason: HOSPADM

## 2020-10-09 RX ORDER — SODIUM CHLORIDE 9 MG/ML
INJECTION, SOLUTION INTRAVENOUS
Status: COMPLETED | OUTPATIENT
Start: 2020-10-09 | End: 2020-10-09

## 2020-10-09 RX ORDER — SODIUM CHLORIDE 9 MG/ML
60 INJECTION, SOLUTION INTRAVENOUS CONTINUOUS
Status: DISCONTINUED | OUTPATIENT
Start: 2020-10-09 | End: 2020-10-09 | Stop reason: HOSPADM

## 2020-10-09 RX ORDER — SODIUM CHLORIDE 9 MG/ML
100 INJECTION, SOLUTION INTRAVENOUS CONTINUOUS
Status: DISCONTINUED | OUTPATIENT
Start: 2020-10-09 | End: 2020-10-09 | Stop reason: HOSPADM

## 2020-10-09 RX ADMIN — SODIUM CHLORIDE 60 ML/HR: 9 INJECTION, SOLUTION INTRAVENOUS at 06:46

## 2020-10-09 NOTE — Clinical Note
TRANSFER - OUT REPORT:     Verbal report given to: Alex.     Report consisted of patient's Situation, Background, Assessment and   Recommendations(SBAR). Opportunity for questions and clarification was provided. Patient transported with a Registered Nurse. Patient transported to: PACU.

## 2020-10-09 NOTE — PROGRESS NOTES
Patient anxious and stated \"I have to get out of here. I've been here for seven hours. \" Patient was leaving unit and I asked if I could get the discharge paperwork signed and give him discharge education before he leaves. Patient complied. Dr. Sue Wilson notified that patient left before 1600 discharge time.

## 2020-10-09 NOTE — Clinical Note
Lesion: Located in the Distal RCA. Stent inserted. Multiple inflations used. First inflation pressure = 12 amy; inflation time: 15 sec. Second inflation pressure: 18 amy; inflation time: 7 sec.

## 2020-10-09 NOTE — PROGRESS NOTES
Provided patient education about and explained the importance of medication compliance. Patient stated that he can afford the antiplatelet medication prescribed. Patient was advised that if the antiplatelet medication becomes unaffordable, he must notify the cardiologist immediately so that an alternate plan for antiplatelet therapy can be made. Patient stated that he has been taking clopidogrel for 12 years and has some at home so that it will be available for the next scheduled dose after discharge. Patient asked appropriate questions and verbalized understanding during this consultation and was given printed teaching materials and my contact information in case I can provide further assistance. Spoke with patient about phase 2 outpatient cardiac rehab. Patient was given a choice of facilities to be enrolled and chose [facility]. Patients information was forwarded to this facility and patient will be contacted by this facility to schedule an initial appointment. Patient was made aware of this verbally and in writing. Patient verbalized understanding and was given printed teaching materials and my contact information in case he needs further assistance, and the address and phone number for the cardiac rehab facility to which he was referred.

## 2020-10-09 NOTE — Clinical Note
Lesion located in the Distal RCA. Balloon inserted. Balloon inflated using multiple inflations inflation technique. Lesion #1: Pressure = 8 amy; Duration = 11 sec. Inflation 2: Pressure = 8 amy; Duration = 12 sec. Inflation 3: Pressure = 8 amy; Duration = 8 sec. Inflation 4: Pressure = 14 amy; Duration = 8 sec.

## 2020-10-09 NOTE — Clinical Note
Lesion: Located in the Distal RCA. Stent inserted. Multiple inflations used. First inflation pressure = 18 amy; inflation time: 7 sec. Second inflation pressure: 18 amy; inflation time: 6 sec.

## 2022-08-27 ENCOUNTER — HOSPITAL ENCOUNTER (EMERGENCY)
Age: 57
Discharge: HOME | End: 2022-08-27
Attending: EMERGENCY MEDICINE
Payer: COMMERCIAL

## 2022-08-27 ENCOUNTER — HOSPITAL ENCOUNTER (INPATIENT)
Age: 57
LOS: 3 days | Discharge: HOME | DRG: 247 | End: 2022-08-30
Attending: EMERGENCY MEDICINE | Admitting: HOSPITALIST
Payer: COMMERCIAL

## 2022-08-27 ENCOUNTER — APPOINTMENT (OUTPATIENT)
Dept: GENERAL RADIOLOGY | Age: 57
End: 2022-08-27
Attending: EMERGENCY MEDICINE
Payer: COMMERCIAL

## 2022-08-27 VITALS
HEART RATE: 78 BPM | DIASTOLIC BLOOD PRESSURE: 86 MMHG | RESPIRATION RATE: 22 BRPM | HEIGHT: 73 IN | TEMPERATURE: 98 F | OXYGEN SATURATION: 99 % | WEIGHT: 255.6 LBS | BODY MASS INDEX: 33.88 KG/M2 | SYSTOLIC BLOOD PRESSURE: 162 MMHG

## 2022-08-27 DIAGNOSIS — I21.4 NSTEMI (NON-ST ELEVATED MYOCARDIAL INFARCTION) (HCC): Primary | ICD-10-CM

## 2022-08-27 DIAGNOSIS — I25.10 CORONARY ARTERY DISEASE WITHOUT ANGINA PECTORIS, UNSPECIFIED VESSEL OR LESION TYPE, UNSPECIFIED WHETHER NATIVE OR TRANSPLANTED HEART: ICD-10-CM

## 2022-08-27 LAB
ALBUMIN SERPL-MCNC: 3.9 G/DL (ref 3.5–5)
ALBUMIN/GLOB SERPL: 1.2 {RATIO} (ref 1.1–2.2)
ALP SERPL-CCNC: 105 U/L (ref 45–117)
ALT SERPL-CCNC: 21 U/L (ref 12–78)
ANION GAP SERPL CALC-SCNC: 10 MMOL/L (ref 5–15)
APTT PPP: 26.5 SEC (ref 21.2–34.1)
APTT PPP: 47.5 SEC (ref 21.2–34.1)
AST SERPL W P-5'-P-CCNC: 15 U/L (ref 15–37)
BASOPHILS # BLD: 0.1 K/UL (ref 0–0.1)
BASOPHILS # BLD: 0.1 K/UL (ref 0–0.2)
BASOPHILS NFR BLD: 1 % (ref 0–1)
BASOPHILS NFR BLD: 1 % (ref 0–2.5)
BILIRUB SERPL-MCNC: 0.2 MG/DL (ref 0.2–1)
BUN SERPL-MCNC: 15 MG/DL (ref 6–20)
BUN/CREAT SERPL: 19 (ref 12–20)
CA-I BLD-MCNC: 9.1 MG/DL (ref 8.5–10.1)
CHLORIDE SERPL-SCNC: 104 MMOL/L (ref 97–108)
CO2 SERPL-SCNC: 26 MMOL/L (ref 21–32)
CREAT SERPL-MCNC: 0.78 MG/DL (ref 0.7–1.3)
DIFFERENTIAL METHOD BLD: NORMAL
EOSINOPHIL # BLD: 0.1 K/UL (ref 0–0.4)
EOSINOPHIL # BLD: 0.2 K/UL (ref 0–0.7)
EOSINOPHIL NFR BLD: 1 % (ref 0–7)
EOSINOPHIL NFR BLD: 2 % (ref 0.9–2.9)
ERYTHROCYTE [DISTWIDTH] IN BLOOD BY AUTOMATED COUNT: 13.2 % (ref 11.5–14.5)
ERYTHROCYTE [DISTWIDTH] IN BLOOD BY AUTOMATED COUNT: 13.5 % (ref 11.5–14.5)
GLOBULIN SER CALC-MCNC: 3.3 G/DL (ref 2–4)
GLUCOSE SERPL-MCNC: 155 MG/DL (ref 65–100)
HCT VFR BLD AUTO: 41.7 % (ref 36.6–50.3)
HCT VFR BLD AUTO: 44.6 % (ref 41–53)
HGB BLD-MCNC: 14.4 G/DL (ref 12.1–17)
HGB BLD-MCNC: 15 G/DL (ref 13.5–17.5)
IMM GRANULOCYTES # BLD AUTO: 0 K/UL (ref 0–0.04)
IMM GRANULOCYTES NFR BLD AUTO: 0 % (ref 0–0.5)
LYMPHOCYTES # BLD: 1.8 K/UL (ref 0.8–3.5)
LYMPHOCYTES # BLD: 2.1 K/UL (ref 1–4.8)
LYMPHOCYTES NFR BLD: 19 % (ref 12–49)
LYMPHOCYTES NFR BLD: 22 % (ref 20.5–51.1)
MCH RBC QN AUTO: 30.4 PG (ref 31–34)
MCH RBC QN AUTO: 30.6 PG (ref 26–34)
MCHC RBC AUTO-ENTMCNC: 33.7 G/DL (ref 31–36)
MCHC RBC AUTO-ENTMCNC: 34.5 G/DL (ref 30–36.5)
MCV RBC AUTO: 88.5 FL (ref 80–99)
MCV RBC AUTO: 90 FL (ref 80–100)
MONOCYTES # BLD: 0.5 K/UL (ref 0.2–2.4)
MONOCYTES # BLD: 0.5 K/UL (ref 0–1)
MONOCYTES NFR BLD: 5 % (ref 5–13)
MONOCYTES NFR BLD: 6 % (ref 1.7–9.3)
NEUTS SEG # BLD: 6.7 K/UL (ref 1.8–7.7)
NEUTS SEG # BLD: 7.4 K/UL (ref 1.8–8)
NEUTS SEG NFR BLD: 69 % (ref 42–75)
NEUTS SEG NFR BLD: 74 % (ref 32–75)
NRBC # BLD: 0 K/UL (ref 0–0.01)
NRBC # BLD: 0.01 K/UL
NRBC BLD-RTO: 0 PER 100 WBC
NRBC BLD-RTO: 0.1 PER 100 WBC
PLATELET # BLD AUTO: 237 K/UL (ref 150–400)
PLATELET # BLD AUTO: 254 K/UL (ref 150–400)
PMV BLD AUTO: 10.8 FL (ref 8.9–12.9)
PMV BLD AUTO: 9.1 FL (ref 6.5–11.5)
POTASSIUM SERPL-SCNC: 3.3 MMOL/L (ref 3.5–5.1)
PROT SERPL-MCNC: 7.2 G/DL (ref 6.4–8.2)
RBC # BLD AUTO: 4.71 M/UL (ref 4.1–5.7)
RBC # BLD AUTO: 4.95 M/UL (ref 4.5–5.9)
SODIUM SERPL-SCNC: 140 MMOL/L (ref 136–145)
THERAPEUTIC RANGE,PTTT: ABNORMAL SEC (ref 82–109)
THERAPEUTIC RANGE,PTTT: NORMAL SEC (ref 82–109)
TROPONIN-HIGH SENSITIVITY: 262 NG/L (ref 0–76)
TROPONIN-HIGH SENSITIVITY: 504 NG/L (ref 0–76)
WBC # BLD AUTO: 9.6 K/UL (ref 4.4–11.3)
WBC # BLD AUTO: 9.9 K/UL (ref 4.1–11.1)

## 2022-08-27 PROCEDURE — 84484 ASSAY OF TROPONIN QUANT: CPT

## 2022-08-27 PROCEDURE — 96376 TX/PRO/DX INJ SAME DRUG ADON: CPT

## 2022-08-27 PROCEDURE — 36415 COLL VENOUS BLD VENIPUNCTURE: CPT

## 2022-08-27 PROCEDURE — 96374 THER/PROPH/DIAG INJ IV PUSH: CPT

## 2022-08-27 PROCEDURE — 65270000029 HC RM PRIVATE

## 2022-08-27 PROCEDURE — 96365 THER/PROPH/DIAG IV INF INIT: CPT

## 2022-08-27 PROCEDURE — 85025 COMPLETE CBC W/AUTO DIFF WBC: CPT

## 2022-08-27 PROCEDURE — 80053 COMPREHEN METABOLIC PANEL: CPT

## 2022-08-27 PROCEDURE — 93005 ELECTROCARDIOGRAM TRACING: CPT

## 2022-08-27 PROCEDURE — 85730 THROMBOPLASTIN TIME PARTIAL: CPT

## 2022-08-27 PROCEDURE — 4A023N7 MEASUREMENT OF CARDIAC SAMPLING AND PRESSURE, LEFT HEART, PERCUTANEOUS APPROACH: ICD-10-PCS | Performed by: INTERNAL MEDICINE

## 2022-08-27 PROCEDURE — 74011250636 HC RX REV CODE- 250/636: Performed by: PHYSICIAN ASSISTANT

## 2022-08-27 PROCEDURE — 74011250637 HC RX REV CODE- 250/637: Performed by: EMERGENCY MEDICINE

## 2022-08-27 PROCEDURE — 71045 X-RAY EXAM CHEST 1 VIEW: CPT

## 2022-08-27 PROCEDURE — 99285 EMERGENCY DEPT VISIT HI MDM: CPT

## 2022-08-27 PROCEDURE — B2111ZZ FLUOROSCOPY OF MULTIPLE CORONARY ARTERIES USING LOW OSMOLAR CONTRAST: ICD-10-PCS | Performed by: INTERNAL MEDICINE

## 2022-08-27 PROCEDURE — 74011250636 HC RX REV CODE- 250/636: Performed by: EMERGENCY MEDICINE

## 2022-08-27 PROCEDURE — 027034Z DILATION OF CORONARY ARTERY, ONE ARTERY WITH DRUG-ELUTING INTRALUMINAL DEVICE, PERCUTANEOUS APPROACH: ICD-10-PCS | Performed by: INTERNAL MEDICINE

## 2022-08-27 RX ORDER — HEPARIN SODIUM 1000 [USP'U]/ML
4000 INJECTION, SOLUTION INTRAVENOUS; SUBCUTANEOUS AS NEEDED
Status: DISCONTINUED | OUTPATIENT
Start: 2022-08-27 | End: 2022-08-27 | Stop reason: HOSPADM

## 2022-08-27 RX ORDER — ASPIRIN 325 MG
325 TABLET ORAL
Status: COMPLETED | OUTPATIENT
Start: 2022-08-27 | End: 2022-08-27

## 2022-08-27 RX ORDER — HEPARIN SODIUM 10000 [USP'U]/100ML
12-25 INJECTION, SOLUTION INTRAVENOUS
Status: DISCONTINUED | OUTPATIENT
Start: 2022-08-27 | End: 2022-08-30 | Stop reason: HOSPADM

## 2022-08-27 RX ORDER — METOPROLOL SUCCINATE 50 MG/1
100 TABLET, EXTENDED RELEASE ORAL DAILY
Status: DISCONTINUED | OUTPATIENT
Start: 2022-08-28 | End: 2022-08-30 | Stop reason: HOSPADM

## 2022-08-27 RX ORDER — METOPROLOL SUCCINATE 100 MG/1
TABLET, EXTENDED RELEASE ORAL
COMMUNITY
Start: 2022-08-22 | End: 2022-08-27

## 2022-08-27 RX ORDER — HEPARIN SODIUM 1000 [USP'U]/ML
4000 INJECTION, SOLUTION INTRAVENOUS; SUBCUTANEOUS ONCE
Status: COMPLETED | OUTPATIENT
Start: 2022-08-27 | End: 2022-08-27

## 2022-08-27 RX ORDER — HEPARIN SODIUM 1000 [USP'U]/ML
4000 INJECTION, SOLUTION INTRAVENOUS; SUBCUTANEOUS AS NEEDED
Status: DISCONTINUED | OUTPATIENT
Start: 2022-08-27 | End: 2022-08-30 | Stop reason: HOSPADM

## 2022-08-27 RX ORDER — ASPIRIN 81 MG/1
81 TABLET ORAL DAILY
Status: DISCONTINUED | OUTPATIENT
Start: 2022-08-28 | End: 2022-08-30 | Stop reason: HOSPADM

## 2022-08-27 RX ORDER — LOSARTAN POTASSIUM 50 MG/1
50 TABLET ORAL DAILY
Status: DISCONTINUED | OUTPATIENT
Start: 2022-08-28 | End: 2022-08-30 | Stop reason: HOSPADM

## 2022-08-27 RX ORDER — LOSARTAN POTASSIUM 50 MG/1
TABLET ORAL
COMMUNITY
Start: 2022-08-22

## 2022-08-27 RX ORDER — HEPARIN SODIUM 10000 [USP'U]/100ML
10-25 INJECTION, SOLUTION INTRAVENOUS
Status: DISCONTINUED | OUTPATIENT
Start: 2022-08-27 | End: 2022-08-27 | Stop reason: HOSPADM

## 2022-08-27 RX ORDER — ATORVASTATIN CALCIUM 20 MG/1
20 TABLET, FILM COATED ORAL
Status: DISCONTINUED | OUTPATIENT
Start: 2022-08-27 | End: 2022-08-28

## 2022-08-27 RX ORDER — SODIUM CHLORIDE 0.9 % (FLUSH) 0.9 %
5-40 SYRINGE (ML) INJECTION EVERY 8 HOURS
Status: DISCONTINUED | OUTPATIENT
Start: 2022-08-27 | End: 2022-08-30 | Stop reason: HOSPADM

## 2022-08-27 RX ORDER — ONDANSETRON 4 MG/1
4 TABLET, ORALLY DISINTEGRATING ORAL
Status: DISCONTINUED | OUTPATIENT
Start: 2022-08-27 | End: 2022-08-30 | Stop reason: HOSPADM

## 2022-08-27 RX ORDER — NITROGLYCERIN 0.4 MG/1
0.4 TABLET SUBLINGUAL
Status: COMPLETED | OUTPATIENT
Start: 2022-08-27 | End: 2022-08-27

## 2022-08-27 RX ORDER — SODIUM CHLORIDE 0.9 % (FLUSH) 0.9 %
5-40 SYRINGE (ML) INJECTION AS NEEDED
Status: DISCONTINUED | OUTPATIENT
Start: 2022-08-27 | End: 2022-08-30 | Stop reason: HOSPADM

## 2022-08-27 RX ORDER — ACETAMINOPHEN 325 MG/1
650 TABLET ORAL
Status: DISCONTINUED | OUTPATIENT
Start: 2022-08-27 | End: 2022-08-30 | Stop reason: HOSPADM

## 2022-08-27 RX ORDER — CLOPIDOGREL BISULFATE 75 MG/1
75 TABLET ORAL DAILY
Status: DISCONTINUED | OUTPATIENT
Start: 2022-08-28 | End: 2022-08-30 | Stop reason: HOSPADM

## 2022-08-27 RX ORDER — HEPARIN SODIUM 1000 [USP'U]/ML
2000 INJECTION, SOLUTION INTRAVENOUS; SUBCUTANEOUS AS NEEDED
Status: DISCONTINUED | OUTPATIENT
Start: 2022-08-27 | End: 2022-08-30 | Stop reason: HOSPADM

## 2022-08-27 RX ORDER — HEPARIN SODIUM 10000 [USP'U]/100ML
12-25 INJECTION, SOLUTION INTRAVENOUS
Status: DISCONTINUED | OUTPATIENT
Start: 2022-08-27 | End: 2022-08-27

## 2022-08-27 RX ORDER — HEPARIN SODIUM 1000 [USP'U]/ML
2000 INJECTION, SOLUTION INTRAVENOUS; SUBCUTANEOUS AS NEEDED
Status: DISCONTINUED | OUTPATIENT
Start: 2022-08-27 | End: 2022-08-27 | Stop reason: HOSPADM

## 2022-08-27 RX ADMIN — NITROGLYCERIN 0.4 MG: 0.4 TABLET, ORALLY DISINTEGRATING SUBLINGUAL at 18:57

## 2022-08-27 RX ADMIN — ASPIRIN 325 MG: 325 TABLET ORAL at 18:57

## 2022-08-27 RX ADMIN — HEPARIN SODIUM 10 UNITS/KG/HR: 10000 INJECTION, SOLUTION INTRAVENOUS at 19:54

## 2022-08-27 RX ADMIN — HEPARIN SODIUM AND DEXTROSE 12 UNITS/KG/HR: 10000; 5 INJECTION INTRAVENOUS at 23:13

## 2022-08-27 RX ADMIN — HEPARIN SODIUM 4000 UNITS: 1000 INJECTION INTRAVENOUS; SUBCUTANEOUS at 19:42

## 2022-08-27 NOTE — Clinical Note
Contrast Dose Calculator:   Patient's age: 56.   Patient's sex: Male. Patient weight (kg) = 115.7. Creatinine level (mg/dL) = 0.52. Creatinine clearance (mL/min): 263. Contrast concentration (mg/mL) = 370. MACD = 300 mL. Max Contrast dose per Creatinine Cl calculator = 591.75 mL.

## 2022-08-27 NOTE — Clinical Note
TRANSFER - OUT REPORT:     Verbal report given to: Alex, (at bedside). Report consisted of patient's Situation, Background, Assessment and   Recommendations(SBAR). Opportunity for questions and clarification was provided. Patient transported with a Registered Nurse and Monitor. Patient transported to: PACU.

## 2022-08-27 NOTE — ED TRIAGE NOTES
Pt reported chest pain that stared yesterday in the left shoulder blade and is getting worse pt denies any NV

## 2022-08-27 NOTE — Clinical Note
Sheath #1: Sheath: inserted. Sheath inserted/placed in the right radial  artery.  6 [place] glidesheath slender

## 2022-08-27 NOTE — Clinical Note
TRANSFER - OUT REPORT:     Verbal report given to: Alex, (at bedside). Report consisted of patient's Situation, Background, Assessment and   Recommendations(SBAR). Opportunity for questions and clarification was provided. Patient transported with a Registered Nurse. Patient transported to: PACU.

## 2022-08-27 NOTE — ED PROVIDER NOTES
EMERGENCY DEPARTMENT HISTORY AND PHYSICAL EXAM      Date: 8/27/2022  Patient Name: Ale Chacon. History of Presenting Illness     Chief Complaint   Patient presents with    Chest Pain       History Provided By: Patient    HPI: Ale Smith, 56 y.o. male presenting to the emergency department part meant with prior history of hypertension and coronary artery disease status (02/2008 stents Cypher to the right coronary artery and the LAD at [facility] 2013: stent to the LAD at [facility] by Dr. Wendy Bee  10/09/2020 3.5 by 12 and 3.5 by15 drug-eluting crescencio stent stent to mid to distal and distal right coronary artery) presents to the emergency department today complaining of right sided chest pains described as a sharp pinch. Started yesterday while walking at work been fairly constant became more intense last night moving first from his mid upper back around to through to his chest and across the center of his chest as described as a burning pain overnight. The pain has been constant with waxing and waning severity. No shortness of breath no sweats no nausea no vomiting. He reports the pain today is similar but more intense than pain he had when he received his last stents 2 years ago. At that time pain resolved with rest but this pain has been fairly constant. He took a BC powder around 3 PM without significant change. Recently seen by his cardiologist Dr. Joni Kohli on Monday who changed his blood pressure medications    There are no other complaints, changes, or physical findings at this time.     PCP: Cassie Avila MD    Current Facility-Administered Medications   Medication Dose Route Frequency Provider Last Rate Last Admin    heparin (porcine) 1,000 unit/mL injection 4,000 Units  4,000 Units IntraVENous ONCE Sangeeta Gage MD        heparin (porcine) 1,000 unit/mL injection 4,000 Units  4,000 Units IntraVENous PRN Sangeeta Gage MD        Or    heparin (porcine) 1,000 unit/mL injection 2,000 Units  2,000 Units IntraVENous PRN Minus MD Luis Eduardo        heparin 25,000 units in D5W 250 ml infusion  10-25 Units/kg/hr (Adjusted) IntraVENous TITRATE Minus MD Luis Eduardo         Current Outpatient Medications   Medication Sig Dispense Refill    losartan (COZAAR) 50 mg tablet       simvastatin (ZOCOR) 40 mg tablet Take  by mouth nightly. metoprolol succinate 100 mg CSpX Take 100 mg by mouth daily. clopidogreL (PLAVIX) 75 mg tab Take 75 mg by mouth daily. aspirin delayed-release 81 mg tablet Take 81 mg by mouth daily. Past History   Past Medical History:  Past Medical History:   Diagnosis Date    CAD (coronary artery disease)     Hypertension        Past Surgical History:  Past Surgical History:   Procedure Laterality Date    HX HEART CATHETERIZATION      x2 caths, stents placed patient states he believes 4 stents in total placed        Family History:  Family History   Problem Relation Age of Onset    Heart Disease Father     Hypertension Father        Social History:  Social History     Tobacco Use    Smoking status: Every Day     Packs/day: 0.50     Types: Cigarettes    Smokeless tobacco: Never   Substance Use Topics    Alcohol use: Never    Drug use: Never       Allergies:  No Known Allergies  Review of Systems   Review of Systems   Constitutional:  Positive for activity change. Negative for fatigue and fever. HENT:  Negative for congestion and sinus pressure. Respiratory:  Negative for cough, choking, chest tightness, shortness of breath and wheezing. Cardiovascular:  Positive for chest pain. Negative for palpitations and leg swelling. Gastrointestinal:  Negative for abdominal pain, nausea and vomiting. Genitourinary:  Negative for difficulty urinating. Musculoskeletal:  Negative for arthralgias, back pain, gait problem and neck pain. Skin:  Negative for rash and wound.    Neurological:  Negative for dizziness, numbness and headaches. Psychiatric/Behavioral:  Negative for confusion. Physical Exam   Physical Exam  Vitals and nursing note reviewed. Constitutional:       Appearance: Normal appearance. He is well-developed. HENT:      Head: Normocephalic and atraumatic. Nose: Nose normal.      Mouth/Throat:      Mouth: Mucous membranes are moist.   Eyes:      Pupils: Pupils are equal, round, and reactive to light. Cardiovascular:      Rate and Rhythm: Normal rate and regular rhythm. Extrasystoles are present. Pulses: Normal pulses. Heart sounds: Normal heart sounds. Comments: Frequent PVCs noted on monitor  Pulmonary:      Effort: Pulmonary effort is normal.      Breath sounds: Normal breath sounds. Chest:      Chest wall: No mass, deformity or tenderness. Abdominal:      Palpations: Abdomen is soft. Musculoskeletal:         General: Normal range of motion. Arms:       Cervical back: Normal range of motion. Comments: No reproducible chest pain. Above are areas of pain that are not reproducible. Skin:     General: Skin is warm and dry. Capillary Refill: Capillary refill takes less than 2 seconds. Neurological:      General: No focal deficit present. Mental Status: He is alert. Psychiatric:         Mood and Affect: Mood normal.         Behavior: Behavior normal.         Thought Content:  Thought content normal.       Lab and Diagnostic Study Results   Labs -     Recent Results (from the past 12 hour(s))   EKG, 12 LEAD, INITIAL    Collection Time: 08/27/22  6:19 PM   Result Value Ref Range    Ventricular Rate 85 BPM    Atrial Rate 85 BPM    P-R Interval 133 ms    QRS Duration 106 ms    Q-T Interval 374 ms    QTC Calculation (Bezet) 445 ms    Calculated P Axis -5 degrees    Calculated R Axis 8 degrees    Calculated T Axis 175 degrees    Diagnosis       Sinus rhythm  Nonspecific repol abnormality, diffuse leads     CBC WITH AUTOMATED DIFF    Collection Time: 08/27/22 6:30 PM   Result Value Ref Range    WBC 9.6 4.4 - 11.3 K/uL    RBC 4.95 4.50 - 5.90 M/uL    HGB 15.0 13.5 - 17.5 g/dL    HCT 44.6 41 - 53 %    MCV 90.0 80 - 100 FL    MCH 30.4 (L) 31 - 34 PG    MCHC 33.7 31.0 - 36.0 g/dL    RDW 13.5 11.5 - 14.5 %    PLATELET 118 531 - 554 K/uL    MPV 9.1 6.5 - 11.5 FL    NRBC 0.1  WBC    ABSOLUTE NRBC 0.01 K/uL    NEUTROPHILS 69 42 - 75 %    LYMPHOCYTES 22 20.5 - 51.1 %    MONOCYTES 6 1.7 - 9.3 %    EOSINOPHILS 2 0.9 - 2.9 %    BASOPHILS 1 0.0 - 2.5 %    ABS. NEUTROPHILS 6.7 1.8 - 7.7 K/UL    ABS. LYMPHOCYTES 2.1 1.0 - 4.8 K/UL    ABS. MONOCYTES 0.5 0.2 - 2.4 K/UL    ABS. EOSINOPHILS 0.2 0.0 - 0.7 K/UL    ABS. BASOPHILS 0.1 0.0 - 0.2 K/UL   TROPONIN-HIGH SENSITIVITY    Collection Time: 08/27/22  6:30 PM   Result Value Ref Range    Troponin-High Sensitivity 262 (HH) 0 - 76 ng/L   METABOLIC PANEL, COMPREHENSIVE    Collection Time: 08/27/22  6:30 PM   Result Value Ref Range    Sodium 140 136 - 145 mmol/L    Potassium 3.3 (L) 3.5 - 5.1 mmol/L    Chloride 104 97 - 108 mmol/L    CO2 26 21 - 32 mmol/L    Anion gap 10 5 - 15 mmol/L    Glucose 155 (H) 65 - 100 mg/dL    BUN 15 6 - 20 mg/dL    Creatinine 0.78 0.70 - 1.30 mg/dL    BUN/Creatinine ratio 19 12 - 20      GFR est AA >60 >60 ml/min/1.73m2    GFR est non-AA >60 >60 ml/min/1.73m2    Calcium 9.1 8.5 - 10.1 mg/dL    Bilirubin, total 0.2 0.2 - 1.0 mg/dL    AST (SGOT) 15 15 - 37 U/L    ALT (SGPT) 21 12 - 78 U/L    Alk.  phosphatase 105 45 - 117 U/L    Protein, total 7.2 6.4 - 8.2 g/dL    Albumin 3.9 3.5 - 5.0 g/dL    Globulin 3.3 2.0 - 4.0 g/dL    A-G Ratio 1.2 1.1 - 2.2         Radiologic Studies -   [unfilled]  CT Results  (Last 48 hours)      None          CXR Results  (Last 48 hours)                 08/27/22 6765  XR CHEST PORT Final result    Impression:  No acute cardiopulmonary process       Narrative:  EXAM: XR CHEST PORT       INDICATION: cp       COMPARISON: 9/25/2020       FINDINGS: A portable AP radiograph of the chest was obtained at 1852 hours. Patient's on cardiac monitor. . The lungs are clear. The cardiac and mediastinal   contours and pulmonary vascularity are normal.  The bones and soft tissues are   grossly within normal limits. Medical Decision Making and ED Course   Differential Diagnosis & Medical Decision Making Provider Note:   Patient is a 53-year-old male presenting to the emergency department today complaining of chest pain that started yesterday sounds like likely unstable angina. High-sensitivity troponin 263 will start heparin treat for NSTEMI. Transfer for additional cardiac work-up. - I am the first and primary provider for this patient. I reviewed the vital signs, available nursing notes, past medical history, past surgical history, family history and social history. The patient's presenting problems have been discussed, and the staff are in agreement with the care plan formulated and outlined with them. I have encouraged them to ask questions as they arise throughout their visit. Vital Signs-Reviewed the patient's vital signs. Patient Vitals for the past 12 hrs:   Temp Pulse Resp BP SpO2   08/27/22 1923 -- 83 -- (!) 178/86 99 %   08/27/22 1857 -- 84 -- (!) 165/86 --   08/27/22 1819 98.7 °F (37.1 °C) 88 18 (!) 194/98 98 %       EKG interpretation: (Preliminary): EKG Interpreted by me. Shows no STEMI. Sinus rhythm rate of 85 bpm.  No ectopy.    V  Completed: 08/27/22 1819  Preliminary result       Ventricular Rate 85 P BPM   Atrial Rate 85 P BPM   P-R Interval 133 P ms   QRS Duration 106 P ms   Q-T Interval 374 P ms   QTC Calculation (Bezet) 445 P ms   Calculated P Axis -5 P degrees   Calculated R Axis 8 P degrees   Calculated T Axis 175 P degrees   Diagnosis Sinus rhythm   Nonspecific repol abnormality, diffuse leads   P            ED Course:           Procedures and Critical Care     Performed by: Kiera Burrows MD  Procedures       CRITICAL CARE NOTE :    7:38 PM    IMPENDING DETERIORATION -Cardiovascular  ASSOCIATED RISK FACTORS - Dysrhythmia and Metabolic changes  MANAGEMENT- Bedside Assessment, Supervision of Care, and Transfer  INTERPRETATION -  Xrays, ECG, and Blood Pressure  INTERVENTIONS - hemodynamic mngmt and Metobolic interventions  CASE REVIEW - Hospitalist/Intensivist, Nursing, and Family  TREATMENT RESPONSE -Improved  PERFORMED BY - Self    NOTES   :  I have spent 30 minutes of critical care time involved in lab review, consultations with specialist, family decision- making, bedside attention and documentation. This time excludes time spent in any separate billed procedures. During this entire length of time I was immediately available to the patient . Kiera Burrows MD    Disposition   Disposition: Condition stable  Transferred to [facility] patient verbally agreed to transfer and understand the risks involved as outlined in the EMTALA form. Diagnosis/Clinical Impression     Clinical Impression:   1. NSTEMI (non-ST elevated myocardial infarction) [facility])        Attestations: Manuel Gomez MD, am the primary clinician of record. Please note that this dictation was completed with [employer], the computer voice recognition software. Quite often unanticipated grammatical, syntax, homophones, and other interpretive errors are inadvertently transcribed by the computer software. Please disregard these errors. Please excuse any errors that have escaped final proofreading. Thank you.

## 2022-08-28 ENCOUNTER — APPOINTMENT (OUTPATIENT)
Dept: NON INVASIVE DIAGNOSTICS | Age: 57
DRG: 247 | End: 2022-08-28
Attending: HOSPITALIST
Payer: COMMERCIAL

## 2022-08-28 LAB
ANION GAP SERPL CALC-SCNC: 5 MMOL/L (ref 5–15)
APTT PPP: 41.1 SEC (ref 21.2–34.1)
APTT PPP: 55.1 SEC (ref 21.2–34.1)
APTT PPP: 65.5 SEC (ref 21.2–34.1)
ATRIAL RATE: 74 BPM
BUN SERPL-MCNC: 11 MG/DL (ref 6–20)
BUN/CREAT SERPL: 21 (ref 12–20)
CA-I BLD-MCNC: 8.8 MG/DL (ref 8.5–10.1)
CALCULATED P AXIS, ECG09: 15 DEGREES
CALCULATED R AXIS, ECG10: 25 DEGREES
CALCULATED T AXIS, ECG11: 24 DEGREES
CHLORIDE SERPL-SCNC: 112 MMOL/L (ref 97–108)
CK SERPL-CCNC: 138 U/L (ref 39–308)
CO2 SERPL-SCNC: 26 MMOL/L (ref 21–32)
CREAT SERPL-MCNC: 0.52 MG/DL (ref 0.7–1.3)
DIAGNOSIS, 93000: NORMAL
ECHO AO ROOT DIAM: 4.1 CM
ECHO AO ROOT INDEX: 1.72 CM/M2
ECHO AV AREA PEAK VELOCITY: 3.4 CM2
ECHO AV AREA/BSA PEAK VELOCITY: 1.4 CM2/M2
ECHO AV PEAK GRADIENT: 6 MMHG
ECHO AV PEAK VELOCITY: 1.3 M/S
ECHO AV VELOCITY RATIO: 1
ECHO EST RA PRESSURE: 3 MMHG
ECHO LA AREA 2C: 23.3 CM2
ECHO LA AREA 4C: 21.6 CM2
ECHO LA DIAMETER INDEX: 1.38 CM/M2
ECHO LA DIAMETER: 3.3 CM
ECHO LA MAJOR AXIS: 6 CM
ECHO LA MINOR AXIS: 5.4 CM
ECHO LA TO AORTIC ROOT RATIO: 0.8
ECHO LA VOL BP: 74 ML (ref 18–58)
ECHO LA VOL/BSA BIPLANE: 31 ML/M2 (ref 16–34)
ECHO LV E' LATERAL VELOCITY: 9 CM/S
ECHO LV E' SEPTAL VELOCITY: 6 CM/S
ECHO LV FRACTIONAL SHORTENING: 30 % (ref 28–44)
ECHO LV INTERNAL DIMENSION DIASTOLE INDEX: 2.34 CM/M2
ECHO LV INTERNAL DIMENSION DIASTOLIC: 5.6 CM (ref 4.2–5.9)
ECHO LV INTERNAL DIMENSION SYSTOLIC INDEX: 1.63 CM/M2
ECHO LV INTERNAL DIMENSION SYSTOLIC: 3.9 CM
ECHO LV IVSD: 1 CM (ref 0.6–1)
ECHO LV MASS 2D: 219.7 G (ref 88–224)
ECHO LV MASS INDEX 2D: 91.9 G/M2 (ref 49–115)
ECHO LV POSTERIOR WALL DIASTOLIC: 1 CM (ref 0.6–1)
ECHO LV RELATIVE WALL THICKNESS RATIO: 0.36
ECHO LVOT AREA: 3.5 CM2
ECHO LVOT DIAM: 2.1 CM
ECHO LVOT PEAK GRADIENT: 6 MMHG
ECHO LVOT PEAK VELOCITY: 1.3 M/S
ECHO MV A VELOCITY: 0.77 M/S
ECHO MV E DECELERATION TIME (DT): 180 MS
ECHO MV E VELOCITY: 0.89 M/S
ECHO MV E/A RATIO: 1.16
ECHO MV E/E' LATERAL: 9.89
ECHO MV E/E' RATIO (AVERAGED): 12.36
ECHO MV E/E' SEPTAL: 14.83
ECHO PV MAX VELOCITY: 1.2 M/S
ECHO PV PEAK GRADIENT: 6 MMHG
ECHO RA AREA 4C: 18.2 CM2
ECHO RA END SYSTOLIC VOLUME APICAL 4 CHAMBER INDEX BSA: 21 ML/M2
ECHO RA VOLUME: 51 ML
ECHO RIGHT VENTRICULAR SYSTOLIC PRESSURE (RVSP): 30 MMHG
ECHO RV INTERNAL DIMENSION: 3.7 CM
ECHO RV TAPSE: 2.3 CM (ref 1.7–?)
ECHO TV REGURGITANT MAX VELOCITY: 2.61 M/S
ECHO TV REGURGITANT PEAK GRADIENT: 27 MMHG
GLUCOSE SERPL-MCNC: 93 MG/DL (ref 65–100)
P-R INTERVAL, ECG05: 132 MS
POTASSIUM SERPL-SCNC: 3.8 MMOL/L (ref 3.5–5.1)
Q-T INTERVAL, ECG07: 388 MS
QRS DURATION, ECG06: 96 MS
QTC CALCULATION (BEZET), ECG08: 430 MS
SODIUM SERPL-SCNC: 143 MMOL/L (ref 136–145)
THERAPEUTIC RANGE,PTTT: ABNORMAL SEC
THERAPEUTIC RANGE,PTTT: ABNORMAL SEC (ref 82–109)
THERAPEUTIC RANGE,PTTT: ABNORMAL SEC (ref 82–109)
TROPONIN-HIGH SENSITIVITY: 3015 NG/L (ref 0–76)
VENTRICULAR RATE, ECG03: 74 BPM

## 2022-08-28 PROCEDURE — 36415 COLL VENOUS BLD VENIPUNCTURE: CPT

## 2022-08-28 PROCEDURE — 85730 THROMBOPLASTIN TIME PARTIAL: CPT

## 2022-08-28 PROCEDURE — 74011250637 HC RX REV CODE- 250/637: Performed by: INTERNAL MEDICINE

## 2022-08-28 PROCEDURE — 74011250636 HC RX REV CODE- 250/636: Performed by: PHYSICIAN ASSISTANT

## 2022-08-28 PROCEDURE — 93005 ELECTROCARDIOGRAM TRACING: CPT

## 2022-08-28 PROCEDURE — 74011250637 HC RX REV CODE- 250/637: Performed by: HOSPITALIST

## 2022-08-28 PROCEDURE — 80048 BASIC METABOLIC PNL TOTAL CA: CPT

## 2022-08-28 PROCEDURE — 74011000250 HC RX REV CODE- 250: Performed by: HOSPITALIST

## 2022-08-28 PROCEDURE — 65270000029 HC RM PRIVATE

## 2022-08-28 PROCEDURE — 84484 ASSAY OF TROPONIN QUANT: CPT

## 2022-08-28 PROCEDURE — 93306 TTE W/DOPPLER COMPLETE: CPT

## 2022-08-28 PROCEDURE — 82550 ASSAY OF CK (CPK): CPT

## 2022-08-28 RX ORDER — ACETAMINOPHEN AND CODEINE PHOSPHATE 300; 30 MG/1; MG/1
2 TABLET ORAL
Status: DISCONTINUED | OUTPATIENT
Start: 2022-08-28 | End: 2022-08-30 | Stop reason: HOSPADM

## 2022-08-28 RX ORDER — ATORVASTATIN CALCIUM 40 MG/1
80 TABLET, FILM COATED ORAL
Status: DISCONTINUED | OUTPATIENT
Start: 2022-08-28 | End: 2022-08-30 | Stop reason: HOSPADM

## 2022-08-28 RX ADMIN — LOSARTAN POTASSIUM 50 MG: 50 TABLET, FILM COATED ORAL at 08:31

## 2022-08-28 RX ADMIN — ATORVASTATIN CALCIUM 80 MG: 40 TABLET, FILM COATED ORAL at 22:15

## 2022-08-28 RX ADMIN — SODIUM CHLORIDE, PRESERVATIVE FREE 10 ML: 5 INJECTION INTRAVENOUS at 01:03

## 2022-08-28 RX ADMIN — SODIUM CHLORIDE, PRESERVATIVE FREE 10 ML: 5 INJECTION INTRAVENOUS at 07:15

## 2022-08-28 RX ADMIN — ATORVASTATIN CALCIUM 20 MG: 20 TABLET, FILM COATED ORAL at 01:01

## 2022-08-28 RX ADMIN — HEPARIN SODIUM AND DEXTROSE 14 UNITS/KG/HR: 10000; 5 INJECTION INTRAVENOUS at 07:11

## 2022-08-28 RX ADMIN — ASPIRIN 81 MG: 81 TABLET, COATED ORAL at 08:31

## 2022-08-28 RX ADMIN — HEPARIN SODIUM 2000 UNITS: 1000 INJECTION, SOLUTION INTRAVENOUS; SUBCUTANEOUS at 07:10

## 2022-08-28 RX ADMIN — HEPARIN SODIUM 2000 UNITS: 1000 INJECTION, SOLUTION INTRAVENOUS; SUBCUTANEOUS at 16:21

## 2022-08-28 RX ADMIN — HEPARIN SODIUM AND DEXTROSE 16 UNITS/KG/HR: 10000; 5 INJECTION INTRAVENOUS at 16:19

## 2022-08-28 RX ADMIN — ACETAMINOPHEN 650 MG: 325 TABLET, FILM COATED ORAL at 16:21

## 2022-08-28 RX ADMIN — CLOPIDOGREL BISULFATE 75 MG: 75 TABLET ORAL at 08:31

## 2022-08-28 RX ADMIN — SODIUM CHLORIDE, PRESERVATIVE FREE 10 ML: 5 INJECTION INTRAVENOUS at 22:16

## 2022-08-28 RX ADMIN — METOPROLOL SUCCINATE 100 MG: 50 TABLET, EXTENDED RELEASE ORAL at 08:31

## 2022-08-28 NOTE — PROGRESS NOTES
Hospitalist Progress Note    Subjective:   Daily Progress Note: 8/28/2022 12:43 PM    Hospital Course:  56years old male with hypertension, CAD s/p stent in 2013, and 2020 presented to ED with c/o retrosternal chest pain, radiating to right side. Presented to [facility], was found to have elevated troponin suggestive of NSTEMI, transferred to our hospital as there is no cardiac catheterization available at the referring facility. Patient was started on heparin drip. Subjective:  Patient reports that his chest pain is better.      Current Facility-Administered Medications   Medication Dose Route Frequency    heparin 25,000 units in D5W 250 ml infusion  12-25 Units/kg/hr IntraVENous TITRATE    heparin (porcine) 1,000 unit/mL injection 4,000 Units  4,000 Units IntraVENous PRN    Or    heparin (porcine) 1,000 unit/mL injection 2,000 Units  2,000 Units IntraVENous PRN    atorvastatin (LIPITOR) tablet 20 mg  20 mg Oral QHS    clopidogreL (PLAVIX) tablet 75 mg  75 mg Oral DAILY    aspirin delayed-release tablet 81 mg  81 mg Oral DAILY    losartan (COZAAR) tablet 50 mg  50 mg Oral DAILY    metoprolol succinate (TOPROL-XL) XL tablet 100 mg  100 mg Oral DAILY    sodium chloride (NS) flush 5-40 mL  5-40 mL IntraVENous Q8H    sodium chloride (NS) flush 5-40 mL  5-40 mL IntraVENous PRN    acetaminophen (TYLENOL) tablet 650 mg  650 mg Oral Q4H PRN    ondansetron (ZOFRAN ODT) tablet 4 mg  4 mg Oral Q6H PRN        Review of Systems  Constitutional: No fevers, No chills, No sweats, No fatigue, No Weakness  Eyes: No redness  Ears, nose, mouth, throat, and face: No nasal congestion, No sore throat, No voice change  Respiratory: No Shortness of Breath, No cough, No wheezing  Cardiovascular: Improving chest pain, No palpitations, No extremity edema  Gastrointestinal: No nausea, No vomiting, No diarrhea, No abdominal pain  Genitourinary: No frequency, No dysuria, No hematuria  Integument/breast: No skin lesion(s)   Neurological: No Confusion, No headaches, No dizziness      Objective:     Visit Vitals  BP (!) 155/91 (BP 1 Location: Right upper arm, BP Patient Position: Sitting)   Pulse 76   Temp 98.9 °F (37.2 °C)   Resp 19   Ht 6' 1\" (1.854 m)   Wt 115.7 kg (255 lb)   SpO2 97%   BMI 33.64 kg/m²      O2 Device: None (Room air)    Temp (24hrs), Av.3 °F (36.8 °C), Min:97.6 °F (36.4 °C), Max:98.9 °F (37.2 °C)      No intake/output data recorded. No intake/output data recorded. PHYSICAL EXAM:  Constitutional: No acute distress  Skin: Extremities and face reveal no rashes. HEENT: Sclerae anicteric. Extra-occular muscles are intact. No oral ulcers. The neck is supple and no masses. Cardiovascular: Regular rate and rhythm. Respiratory:  Clear breath sounds bilaterally with no wheezes, rales, or rhonchi. GI: Abdomen nondistended, soft, and nontender. Normal active bowel sounds. Musculoskeletal: No pitting edema of the lower legs. Able to move all ext  Neurological:  Patient is alert and oriented.  Cranial nerves II-XII grossly intact  Psychiatric: Mood appears appropriate       Data Review    Recent Results (from the past 24 hour(s))   EKG, 12 LEAD, INITIAL    Collection Time: 22  6:19 PM   Result Value Ref Range    Ventricular Rate 85 BPM    Atrial Rate 85 BPM    P-R Interval 133 ms    QRS Duration 106 ms    Q-T Interval 374 ms    QTC Calculation (Bezet) 445 ms    Calculated P Axis -5 degrees    Calculated R Axis 8 degrees    Calculated T Axis 175 degrees    Diagnosis       Sinus rhythm  Nonspecific repol abnormality, diffuse leads     CBC WITH AUTOMATED DIFF    Collection Time: 22  6:30 PM   Result Value Ref Range    WBC 9.6 4.4 - 11.3 K/uL    RBC 4.95 4.50 - 5.90 M/uL    HGB 15.0 13.5 - 17.5 g/dL    HCT 44.6 41 - 53 %    MCV 90.0 80 - 100 FL    MCH 30.4 (L) 31 - 34 PG    MCHC 33.7 31.0 - 36.0 g/dL    RDW 13.5 11.5 - 14.5 %    PLATELET 502 140 - 461 K/uL    MPV 9.1 6.5 - 11.5 FL    NRBC 0.1  WBC    ABSOLUTE NRBC 0.01 K/uL    NEUTROPHILS 69 42 - 75 %    LYMPHOCYTES 22 20.5 - 51.1 %    MONOCYTES 6 1.7 - 9.3 %    EOSINOPHILS 2 0.9 - 2.9 %    BASOPHILS 1 0.0 - 2.5 %    ABS. NEUTROPHILS 6.7 1.8 - 7.7 K/UL    ABS. LYMPHOCYTES 2.1 1.0 - 4.8 K/UL    ABS. MONOCYTES 0.5 0.2 - 2.4 K/UL    ABS. EOSINOPHILS 0.2 0.0 - 0.7 K/UL    ABS. BASOPHILS 0.1 0.0 - 0.2 K/UL   TROPONIN-HIGH SENSITIVITY    Collection Time: 08/27/22  6:30 PM   Result Value Ref Range    Troponin-High Sensitivity 262 (HH) 0 - 76 ng/L   METABOLIC PANEL, COMPREHENSIVE    Collection Time: 08/27/22  6:30 PM   Result Value Ref Range    Sodium 140 136 - 145 mmol/L    Potassium 3.3 (L) 3.5 - 5.1 mmol/L    Chloride 104 97 - 108 mmol/L    CO2 26 21 - 32 mmol/L    Anion gap 10 5 - 15 mmol/L    Glucose 155 (H) 65 - 100 mg/dL    BUN 15 6 - 20 mg/dL    Creatinine 0.78 0.70 - 1.30 mg/dL    BUN/Creatinine ratio 19 12 - 20      GFR est AA >60 >60 ml/min/1.73m2    GFR est non-AA >60 >60 ml/min/1.73m2    Calcium 9.1 8.5 - 10.1 mg/dL    Bilirubin, total 0.2 0.2 - 1.0 mg/dL    AST (SGOT) 15 15 - 37 U/L    ALT (SGPT) 21 12 - 78 U/L    Alk.  phosphatase 105 45 - 117 U/L    Protein, total 7.2 6.4 - 8.2 g/dL    Albumin 3.9 3.5 - 5.0 g/dL    Globulin 3.3 2.0 - 4.0 g/dL    A-G Ratio 1.2 1.1 - 2.2     PTT    Collection Time: 08/27/22  6:30 PM   Result Value Ref Range    aPTT 26.5 21.2 - 34.1 sec    aPTT, therapeutic range   82 - 109 sec   EKG, 12 LEAD, INITIAL    Collection Time: 08/27/22  9:18 PM   Result Value Ref Range    Ventricular Rate 74 BPM    Atrial Rate 74 BPM    P-R Interval 132 ms    QRS Duration 96 ms    Q-T Interval 388 ms    QTC Calculation (Bezet) 430 ms    Calculated P Axis 15 degrees    Calculated R Axis 25 degrees    Calculated T Axis 24 degrees    Diagnosis       Normal sinus rhythm  Possible Inferior infarct , age undetermined  Abnormal ECG  When compared with ECG of 27-AUG-2022 18:19,  No significant change was found  Confirmed by [facility], [address] ([place] [address]) on 8/28/2022 10:26:53 AM     PTT    Collection Time: 08/27/22 10:24 PM   Result Value Ref Range    aPTT 47.5 (H) 21.2 - 34.1 sec    aPTT, therapeutic range   82 - 109 sec   CBC WITH AUTOMATED DIFF    Collection Time: 08/27/22 10:24 PM   Result Value Ref Range    WBC 9.9 4.1 - 11.1 K/uL    RBC 4.71 4.10 - 5.70 M/uL    HGB 14.4 12.1 - 17.0 g/dL    HCT 41.7 36.6 - 50.3 %    MCV 88.5 80.0 - 99.0 FL    MCH 30.6 26.0 - 34.0 PG    MCHC 34.5 30.0 - 36.5 g/dL    RDW 13.2 11.5 - 14.5 %    PLATELET 461 519 - 556 K/uL    MPV 10.8 8.9 - 12.9 FL    NRBC 0.0 0.0  WBC    ABSOLUTE NRBC 0.00 0.00 - 0.01 K/uL    NEUTROPHILS 74 32 - 75 %    LYMPHOCYTES 19 12 - 49 %    MONOCYTES 5 5 - 13 %    EOSINOPHILS 1 0 - 7 %    BASOPHILS 1 0 - 1 %    IMMATURE GRANULOCYTES 0 0 - 0.5 %    ABS. NEUTROPHILS 7.4 1.8 - 8.0 K/UL    ABS. LYMPHOCYTES 1.8 0.8 - 3.5 K/UL    ABS. MONOCYTES 0.5 0.0 - 1.0 K/UL    ABS. EOSINOPHILS 0.1 0.0 - 0.4 K/UL    ABS. BASOPHILS 0.1 0.0 - 0.1 K/UL    ABS. IMM.  GRANS. 0.0 0.00 - 0.04 K/UL    DF AUTOMATED     TROPONIN-HIGH SENSITIVITY    Collection Time: 08/27/22 10:24 PM   Result Value Ref Range    Troponin-High Sensitivity 504 (HH) 0 - 76 ng/L   METABOLIC PANEL, BASIC    Collection Time: 08/28/22  4:59 AM   Result Value Ref Range    Sodium 143 136 - 145 mmol/L    Potassium 3.8 3.5 - 5.1 mmol/L    Chloride 112 (H) 97 - 108 mmol/L    CO2 26 21 - 32 mmol/L    Anion gap 5 5 - 15 mmol/L    Glucose 93 65 - 100 mg/dL    BUN 11 6 - 20 mg/dL    Creatinine 0.52 (L) 0.70 - 1.30 mg/dL    BUN/Creatinine ratio 21 (H) 12 - 20      GFR est AA >60 >60 ml/min/1.73m2    GFR est non-AA >60 >60 ml/min/1.73m2    Calcium 8.8 8.5 - 10.1 mg/dL   TROPONIN-HIGH SENSITIVITY    Collection Time: 08/28/22  4:59 AM   Result Value Ref Range    Troponin-High Sensitivity 3,015 (HH) 0 - 76 ng/L   PTT    Collection Time: 08/28/22  4:59 AM   Result Value Ref Range    aPTT 41.1 (H) 21.2 - 34.1 sec    aPTT, therapeutic range   82 - 109 sec   CK    Collection Time: 08/28/22 4:59 AM   Result Value Ref Range     39 - 308 U/L   ECHO ADULT COMPLETE    Collection Time: 08/28/22 10:11 AM   Result Value Ref Range    LA Minor Axis 5.4 cm    LA Major Axis 6.0 cm    LA Area 2C 23.3 cm2    LA Area 4C 21.6 cm2    LA Volume BP 74 (A) 18 - 58 mL    LA Diameter 3.3 cm    RA Area 4C 18.2 cm2    RA Volume 51 ml    Est. RA Pressure 3 mmHg    AV Peak Velocity 1.3 m/s    AV Peak Gradient 6 mmHg    AV Area by Peak Velocity 3.4 cm2    Aortic Root 4.1 cm    IVSd 1.0 0.6 - 1.0 cm    LVIDd 5.6 4.2 - 5.9 cm    LVIDs 3.9 cm    LVOT Diameter 2.1 cm    LVOT Peak Velocity 1.3 m/s    LVOT Peak Gradient 6 mmHg    LVPWd 1.0 0.6 - 1.0 cm    LV E' Lateral Velocity 9 cm/s    LV E' Septal Velocity 6 cm/s    LVOT Area 3.5 cm2    MV E Wave Deceleration Time 180.0 ms    MV A Velocity 0.77 m/s    MV E Velocity 0.89 m/s    PV Max Velocity 1.2 m/s    PV Peak Gradient 6 mmHg    RVIDd 3.7 cm    TAPSE 2.3 1.7 cm    TR Max Velocity 2.61 m/s    TR Peak Gradient 27 mmHg    Fractional Shortening 2D 30 28 - 44 %    LVIDd Index 2.34 cm/m2    LVIDs Index 1.63 cm/m2    LV RWT Ratio 0.36     LV Mass 2D 219.7 88 - 224 g    LV Mass 2D Index 91.9 49 - 115 g/m2    MV E/A 1.16     E/E' Ratio (Averaged) 12.36     E/E' Lateral 9.89     E/E' Septal 14.83     LA Volume Index BP 31 16 - 34 ml/m2    LA Size Index 1.38 cm/m2    LA/AO Root Ratio 0.80     RA Volume Index A4C 21 mL/m2    Ao Root Index 1.72 cm/m2    AV Velocity Ratio 1.00     SARBJIT/BSA Peak Velocity 1.4 cm2/m2    RVSP 30 mmHg   PTT    Collection Time: 08/28/22 10:23 AM   Result Value Ref Range    aPTT 65.5 (H) 21.2 - 34.1 sec    aPTT, therapeutic range   82 - 109 sec           Assessment / Plan:  NSTEMI:  Continue heparin drip  Awaiting cardiology consult  Will follow ECHO  Continue aspirin, plavix and statin. Hypertension:  Continue metoprolol 100 mg po daily and Cozaar 50 mg po daily    Hx of CAD s/p stent        30.0 - 39.9 Obese / Body mass index is 33.64 kg/m².     Code status: Full  Prophylaxis:  on heparin drip  Recommended Disposition: Home w/Family      time spent 35 minutes involving direct patient care as well as reviewing patient's labs and coordination of care with nursing staff     Care Plan discussed with: Patient/Family/RN/Case Management        Total time spent with patient: 35 minutes.

## 2022-08-28 NOTE — ROUTINE PROCESS
TRANSFER - OUT REPORT:    Verbal report given to Olu(name) on Ayush Avelar.  being transferred to [facility](unit) for routine progression of care       Report consisted of patients Situation, Background, Assessment and   Recommendations(SBAR). Information from the following report(s) SBAR was reviewed with the receiving nurse. Lines:   Peripheral IV 08/27/22 Right Forearm (Active)   Site Assessment Clean, dry, & intact 08/27/22 2124   Phlebitis Assessment 0 08/27/22 2124   Infiltration Assessment 0 08/27/22 2124   Dressing Status Clean, dry, & intact 08/27/22 2124   Dressing Type Transparent 08/27/22 1937   Hub Color/Line Status Pink 08/27/22 1937       Peripheral IV 08/27/22 Forearm (Active)   Site Assessment Clean, dry, & intact 08/27/22 2124   Phlebitis Assessment 0 08/27/22 2124   Infiltration Assessment 0 08/27/22 2124   Dressing Status Clean, dry, & intact 08/27/22 2124        Opportunity for questions and clarification was provided.       Patient transported with:   Monitor  Tech

## 2022-08-28 NOTE — PROGRESS NOTES
Dual skin assessment Done with Esa Weiss RN skin is clean dry and intact.  Patient's Marco A Score is 23

## 2022-08-28 NOTE — ED TRIAGE NOTES
Chest pain started 2100 yestt. Seen at [facility]. Transferred here for nstemi. Chest discomfort 1/10.  Intermittant radiating thru to right shoulderblade Denies n/v.

## 2022-08-28 NOTE — ED PROVIDER NOTES
EMERGENCY DEPARTMENT HISTORY AND PHYSICAL EXAM      Date: 8/27/2022  Patient Name: Arsenio Quiñones History of Presenting Illness     Chief Complaint   Patient presents with    Chest Pain       History Provided By: Patient    HPI: Arsenio Quiñones, 56 y.o. male with a past medical history significant for HTN, CAD (02/2008 stents to the right coronary artery and the LAD at [facility] 2013: stent to the LAD at [facility] by Dr. Johanna Jurado  10/09/2020 3.5 by 12 and 3.5 by15 drug-eluting crescencio stent stent to mid to distal and distal right coronary artery) presents to the ED today c/o right-sided chest pains described as a sharp pinch. Started yesterday while walking at work been fairly constant became more intense last night moving first from his mid upper back around to through to his chest and across the center of his chest as described as a burning pain overnight. The pain has been constant with waxing and waning severity. No shortness of breath no sweats no nausea no vomiting. He reports the pain today is similar but more intense than pain he had when he received his last stents 2 years ago. At that time pain resolved with rest but this pain has been fairly constant. He took a BC powder around 3 PM without significant change. Recently seen by his cardiologist Dr. David Bullock on Monday who changed his blood pressure medications       There are no other complaints, changes, or physical findings at this time.     PCP: Priscilla Don MD    Current Facility-Administered Medications on File Prior to Encounter   Medication Dose Route Frequency Provider Last Rate Last Admin    [COMPLETED] aspirin tablet 325 mg  325 mg Oral NOW Adam Payan MD   325 mg at 08/27/22 1857    [COMPLETED] nitroglycerin (NITROSTAT) tablet 0.4 mg  0.4 mg SubLINGual NOW Adam Payan MD   0.4 mg at 08/27/22 1857    [COMPLETED] heparin (porcine) 1,000 unit/mL injection 4,000 Units  4,000 Units IntraVENous ONCE Bettina Barthel, MD   4,000 Units at 08/27/22 1942    [DISCONTINUED] heparin 25,000 units in D5W 250 ml infusion  12-25 Units/kg/hr (Order-Specific) IntraVENous TITRATE Bettina Barthel, MD        [DISCONTINUED] heparin (porcine) 1,000 unit/mL injection 4,000 Units  4,000 Units IntraVENous PRN Bettina Barthel, MD        [DISCONTINUED] heparin (porcine) 1,000 unit/mL injection 2,000 Units  2,000 Units IntraVENous PRN Bettina Barthel, MD        [DISCONTINUED] heparin 25,000 units in D5W 250 ml infusion  10-25 Units/kg/hr (Adjusted) IntraVENous TITRATE Bettina Barthel, MD   Continued On External Transport at 08/27/22 2020     Current Outpatient Medications on File Prior to Encounter   Medication Sig Dispense Refill    losartan (COZAAR) 50 mg tablet       simvastatin (ZOCOR) 40 mg tablet Take  by mouth nightly.  metoprolol succinate 100 mg CSpX Take 100 mg by mouth daily.  clopidogreL (PLAVIX) 75 mg tab Take 75 mg by mouth daily.  aspirin delayed-release 81 mg tablet Take 81 mg by mouth daily. Past History     Past Medical History:  Past Medical History:   Diagnosis Date    CAD (coronary artery disease)     Hypertension        Past Surgical History:  Past Surgical History:   Procedure Laterality Date    HX HEART CATHETERIZATION      x2 caths, stents placed patient states he believes 4 stents in total placed        Family History:  Family History   Problem Relation Age of Onset    Hypertension Mother     Heart Disease Mother     Heart Disease Father     Hypertension Father        Social History:  Social History     Tobacco Use    Smoking status: Every Day     Packs/day: 0.50     Types: Cigarettes    Smokeless tobacco: Never   Substance Use Topics    Alcohol use: Never    Drug use: Never       Allergies:  No Known Allergies    Review of Systems   Review of Systems   Constitutional: Negative. Negative for chills, diaphoresis and fever. HENT: Negative. Negative for congestion, rhinorrhea and sore throat. Eyes: Negative. Negative for pain. Respiratory: Negative. Negative for cough, chest tightness, shortness of breath and wheezing. Cardiovascular:  Positive for chest pain. Negative for palpitations and leg swelling. Gastrointestinal: Negative. Negative for abdominal pain, diarrhea, nausea and vomiting. Genitourinary: Negative. Negative for difficulty urinating, dysuria, flank pain, frequency and hematuria. Musculoskeletal: Negative. Negative for back pain, gait problem and neck pain. Skin: Negative. Negative for rash. Neurological: Negative. Negative for dizziness, syncope, weakness, light-headedness, numbness and headaches. Psychiatric/Behavioral: Negative. All other systems reviewed and are negative. Physical Exam   Physical Exam  Vitals and nursing note reviewed. Constitutional:       General: He is not in acute distress. Appearance: Normal appearance. He is not ill-appearing or toxic-appearing. HENT:      Head: Normocephalic and atraumatic. Mouth/Throat:      Mouth: Mucous membranes are moist.   Eyes:      Extraocular Movements: Extraocular movements intact. Conjunctiva/sclera: Conjunctivae normal.      Pupils: Pupils are equal, round, and reactive to light. Cardiovascular:      Rate and Rhythm: Normal rate and regular rhythm. Pulses: Normal pulses. Heart sounds: Normal heart sounds. No murmur heard. No friction rub. No gallop. Pulmonary:      Effort: Pulmonary effort is normal. No respiratory distress. Breath sounds: Normal breath sounds. No wheezing, rhonchi or rales. Abdominal:      General: Bowel sounds are normal. There is no distension. Palpations: Abdomen is soft. Tenderness: There is no abdominal tenderness. There is no guarding or rebound. Musculoskeletal:         General: No tenderness. Cervical back: Neck supple. No tenderness.       Right lower leg: No edema. Left lower leg: No edema. Skin:     General: Skin is warm and dry. Capillary Refill: Capillary refill takes less than 2 seconds. Findings: No rash. Neurological:      General: No focal deficit present. Mental Status: He is alert and oriented to person, place, and time. Psychiatric:         Mood and Affect: Mood normal.         Behavior: Behavior normal.         Thought Content: Thought content normal.       Lab and Diagnostic Study Results   Labs -     Recent Results (from the past 12 hour(s))   EKG, 12 LEAD, INITIAL    Collection Time: 08/27/22  6:19 PM   Result Value Ref Range    Ventricular Rate 85 BPM    Atrial Rate 85 BPM    P-R Interval 133 ms    QRS Duration 106 ms    Q-T Interval 374 ms    QTC Calculation (Bezet) 445 ms    Calculated P Axis -5 degrees    Calculated R Axis 8 degrees    Calculated T Axis 175 degrees    Diagnosis       Sinus rhythm  Nonspecific repol abnormality, diffuse leads     CBC WITH AUTOMATED DIFF    Collection Time: 08/27/22  6:30 PM   Result Value Ref Range    WBC 9.6 4.4 - 11.3 K/uL    RBC 4.95 4.50 - 5.90 M/uL    HGB 15.0 13.5 - 17.5 g/dL    HCT 44.6 41 - 53 %    MCV 90.0 80 - 100 FL    MCH 30.4 (L) 31 - 34 PG    MCHC 33.7 31.0 - 36.0 g/dL    RDW 13.5 11.5 - 14.5 %    PLATELET 061 284 - 293 K/uL    MPV 9.1 6.5 - 11.5 FL    NRBC 0.1  WBC    ABSOLUTE NRBC 0.01 K/uL    NEUTROPHILS 69 42 - 75 %    LYMPHOCYTES 22 20.5 - 51.1 %    MONOCYTES 6 1.7 - 9.3 %    EOSINOPHILS 2 0.9 - 2.9 %    BASOPHILS 1 0.0 - 2.5 %    ABS. NEUTROPHILS 6.7 1.8 - 7.7 K/UL    ABS. LYMPHOCYTES 2.1 1.0 - 4.8 K/UL    ABS. MONOCYTES 0.5 0.2 - 2.4 K/UL    ABS. EOSINOPHILS 0.2 0.0 - 0.7 K/UL    ABS.  BASOPHILS 0.1 0.0 - 0.2 K/UL   TROPONIN-HIGH SENSITIVITY    Collection Time: 08/27/22  6:30 PM   Result Value Ref Range    Troponin-High Sensitivity 262 (HH) 0 - 76 ng/L   METABOLIC PANEL, COMPREHENSIVE    Collection Time: 08/27/22  6:30 PM   Result Value Ref Range Sodium 140 136 - 145 mmol/L    Potassium 3.3 (L) 3.5 - 5.1 mmol/L    Chloride 104 97 - 108 mmol/L    CO2 26 21 - 32 mmol/L    Anion gap 10 5 - 15 mmol/L    Glucose 155 (H) 65 - 100 mg/dL    BUN 15 6 - 20 mg/dL    Creatinine 0.78 0.70 - 1.30 mg/dL    BUN/Creatinine ratio 19 12 - 20      GFR est AA >60 >60 ml/min/1.73m2    GFR est non-AA >60 >60 ml/min/1.73m2    Calcium 9.1 8.5 - 10.1 mg/dL    Bilirubin, total 0.2 0.2 - 1.0 mg/dL    AST (SGOT) 15 15 - 37 U/L    ALT (SGPT) 21 12 - 78 U/L    Alk. phosphatase 105 45 - 117 U/L    Protein, total 7.2 6.4 - 8.2 g/dL    Albumin 3.9 3.5 - 5.0 g/dL    Globulin 3.3 2.0 - 4.0 g/dL    A-G Ratio 1.2 1.1 - 2.2     PTT    Collection Time: 08/27/22  6:30 PM   Result Value Ref Range    aPTT 26.5 21.2 - 34.1 sec    aPTT, therapeutic range   82 - 109 sec   PTT    Collection Time: 08/27/22 10:24 PM   Result Value Ref Range    aPTT 47.5 (H) 21.2 - 34.1 sec    aPTT, therapeutic range   82 - 109 sec   CBC WITH AUTOMATED DIFF    Collection Time: 08/27/22 10:24 PM   Result Value Ref Range    WBC 9.9 4.1 - 11.1 K/uL    RBC 4.71 4.10 - 5.70 M/uL    HGB 14.4 12.1 - 17.0 g/dL    HCT 41.7 36.6 - 50.3 %    MCV 88.5 80.0 - 99.0 FL    MCH 30.6 26.0 - 34.0 PG    MCHC 34.5 30.0 - 36.5 g/dL    RDW 13.2 11.5 - 14.5 %    PLATELET 259 403 - 501 K/uL    MPV 10.8 8.9 - 12.9 FL    NRBC 0.0 0.0  WBC    ABSOLUTE NRBC 0.00 0.00 - 0.01 K/uL    NEUTROPHILS 74 32 - 75 %    LYMPHOCYTES 19 12 - 49 %    MONOCYTES 5 5 - 13 %    EOSINOPHILS 1 0 - 7 %    BASOPHILS 1 0 - 1 %    IMMATURE GRANULOCYTES 0 0 - 0.5 %    ABS. NEUTROPHILS 7.4 1.8 - 8.0 K/UL    ABS. LYMPHOCYTES 1.8 0.8 - 3.5 K/UL    ABS. MONOCYTES 0.5 0.0 - 1.0 K/UL    ABS. EOSINOPHILS 0.1 0.0 - 0.4 K/UL    ABS. BASOPHILS 0.1 0.0 - 0.1 K/UL    ABS. IMM.  GRANS. 0.0 0.00 - 0.04 K/UL    DF AUTOMATED     TROPONIN-HIGH SENSITIVITY    Collection Time: 08/27/22 10:24 PM   Result Value Ref Range    Troponin-High Sensitivity 504 (HH) 0 - 76 ng/L       Radiologic Studies -   @[email or web address]@  CT Results  (Last 48 hours)      None          CXR Results  (Last 48 hours)                 08/27/22 1855  XR CHEST PORT Final result    Impression:  No acute cardiopulmonary process       Narrative:  EXAM: XR CHEST PORT       INDICATION: cp       COMPARISON: 9/25/2020       FINDINGS: A portable AP radiograph of the chest was obtained at 1852 hours. Patient's on cardiac monitor. . The lungs are clear. The cardiac and mediastinal   contours and pulmonary vascularity are normal.  The bones and soft tissues are   grossly within normal limits. Medical Decision Making and ED Course   Differential Diagnosis & Medical Decision Making Provider Note:   Patient presented as a transfer from outside hospital with an elevated troponin concerning for NSTEMI. Patients heparin was discontinued during EMS transit but was restarted upon arrival to this ED. Will consult hospitalist for admission and further work-up. - I am the first provider for this patient. I reviewed the vital signs, available nursing notes, past medical history, past surgical history, family history and social history. The patients presenting problems have been discussed, and they are in agreement with the care plan formulated and outlined with them. I have encouraged them to ask questions as they arise throughout their visit. Vital Signs-Reviewed the patient's vital signs. Patient Vitals for the past 12 hrs:   Temp Pulse Resp BP SpO2   08/28/22 0055 97.6 °F (36.4 °C) 68 19 (!) 148/82 98 %   08/28/22 0015 -- 82 18 (!) 160/98 97 %   08/28/22 0000 -- 65 -- -- --   08/27/22 2319 -- 74 19 (!) 159/75 97 %   08/27/22 2214 -- 71 16 (!) 154/85 97 %   08/27/22 2120 98.2 °F (36.8 °C) 77 19 (!) 159/93 98 %       ED Course:   Consult Note:  11:36 PM  Chichi Hill PA-C spoke with Dr. Sudarshan Mabry,  internal medicine  Discussed pt's hx, disposition, and available diagnostic and imaging results. Reviewed care plans. Consultant agrees with plans as outlined. Dr. Kev Fernando will see and evaluate the patient for admission. TOBACCO COUNSELING: Upon evaluation, pt expressed that they are a current tobacco user. For approximately 10 minutes, pt has been counseled on the dangers of smoking and was encouraged to quit as soon as possible in order to decrease further risks to their health. Pt has conveyed their understanding of the risks involved should they continue to use tobacco products. Procedures   Performed by: Delaney Valadez PA-C  Procedures      Disposition   Disposition: Admitted to Floor Medical Floor the case was discussed with the admitting physician Dr. Kev Fernando      Diagnosis/Clinical Impression     Clinical Impression:   1. NSTEMI (non-ST elevated myocardial infarction) ([facility] [address]        Attestations: Fredis FUCHS PA-C, am the primary clinician of record. Please note that this dictation was completed with Signature, the computer voice recognition software. Quite often unanticipated grammatical, syntax, homophones, and other interpretive errors are inadvertently transcribed by the computer software. Please disregard these errors. Please excuse any errors that have escaped final proofreading. Thank you.

## 2022-08-28 NOTE — PROGRESS NOTES
Reason for Admission:  NSTEMI                     RUR Score:   6%                  Plan for utilizing home health:  none        PCP: First and Last name:  Peng Paz MD     Name of Practice:    Are you a current patient: Yes/No: no   Approximate date of last visit: \"a couple of years\"  Follow with Dr Gorman-Cardiology   Can you participate in a virtual visit with your PCP:                     Current Advanced Directive/Advance Care Plan: Full Code      Healthcare Decision Maker:   Click here to complete [address] including selection of the Healthcare Decision Maker Relationship (ie \"Primary\")Abbi [email or web address]@ [id number]   will also provide transport home at [place]                             Transition of Care Plan:   Pt lives with his wife in a single family home. He states ind with all ADLs and IADLs. Denies DME. Pt continues to work and drive    He will need a work note at [facility].   Expect Home Self Care

## 2022-08-28 NOTE — CONSULTS
Cardiology Consult    NAME: Azeem Bass :  1965   MRN:  [id number]     Date/Time:  2022 3:09 PM    Patient PCP: Bentley Kennedy MD  ________________________________________________________________________     Assessment:   Acute non-Q wave MI  H/o inferior wall MI  CAD status post PCI of RCA and LAD  and   Last stent placed 10/9/2020 with Issa stent to the mid and distal RCA  Nicotine/tobacco addiction  Hyperlipidemia  Hypertension      Plan:   Dual antiplatelet therapy  Encourage smoking cessation, might be a good candidate for Chantix  Plan for cardiac catheterization tomorrow  Cardiac rehab evaluation      []        High complexity decision making was performed        Subjective:   CHIEF COMPLAINT: Chest pain      REASON FOR CONSULT: Chest pain      HISTORY OF PRESENT ILLNESS:     Azeem Bass is a 56 y.o. WHITE/NON- male who CAD s/p PCI of the LAD and RCA , PCI of the LAD , and PCI of the distal and mid RCA 10/9/2020. He is a smoker with hypercholesterolemia, hypertension, tobacco use, and came in transfer from [facility] after developing substernal chest pain with radiation to his back. Chest pain was off-and-on over 2 days but became intense on the day of admission. Reports compliance with his prescribed medicines. No drugs or excessive alcohol. Today, he is chest pain-free. No shortness of breath or diaphoresis. Says he has been using nicotine lozenges to try and quit smoking.         Past Medical History:   Diagnosis Date    CAD (coronary artery disease)     Hypertension       Past Surgical History:   Procedure Laterality Date    HX HEART CATHETERIZATION      x2 caths, stents placed patient states he believes 4 stents in total placed      No Known Allergies   Meds:  See below  Social History     Tobacco Use    Smoking status: Every Day     Packs/day: 0.50     Types: Cigarettes    Smokeless tobacco: Never   Substance Use Topics    Alcohol use: Never Family History   Problem Relation Age of Onset    Hypertension Mother     Heart Disease Mother     Heart Disease Father     Hypertension Father        REVIEW OF SYSTEMS:     []         Unable to obtain  ROS due to ---   [x]         Total of 12 systems reviewed as follows:    Constitutional: negative fever, negative chills, negative weight loss  Eyes:   negative visual changes  ENT:   negative sore throat, tongue or lip swelling  Respiratory:  negative cough, negative dyspnea  Cards:  + chest pain, palpitations  GI:   negative for nausea, vomiting, diarrhea, and abdominal pain  Genitourinary: negative for frequency, dysuria  Integument:  negative for rash   Hematologic:  negative for easy bruising and gum/nose bleeding  Musculoskel: negative for myalgias,  back pain  Neurological:  negative for headaches, dizziness, vertigo, weakness  Behavl/Psych: negative for feelings of anxiety, depression     Pertinent Positives include :    Objective:      Physical Exam:    Last 24hrs VS reviewed since prior progress note. Most recent are:    Visit Vitals  BP (!) 163/91 (BP 1 Location: Right upper arm, BP Patient Position: Sitting)   Pulse 73   Temp 98.8 °F (37.1 °C)   Resp 19   Ht 6' 1\" (1.854 m)   Wt 115.7 kg (255 lb)   SpO2 98%   BMI 33.64 kg/m²     No intake or output data in the 24 hours ending 08/28/22 1509     General Appearance: Well developed, alert, no acute distress. Ears/Nose/Mouth/Throat: Moist mucosa  Neck: Supple. JVP within normal limits. Carotids good upstrokes  Chest: Lungs clear to auscultation bilaterally. Cardiovascular: Regular rate and rhythm, S1S2 normal, soft systolic murmur  Abdomen: Soft, non-tender, bowel sounds are active. Extremities: No edema bilaterally. distal pulses +1. Skin: Warm and dry. Neuro: Alert and oriented x3, normal speech; follows simple commands  Psychiatric: Cooperative; appropriate    []         Post-cath site without hematoma, bruit, tenderness, or thrill.   Distal pulses intact. Data:      Telemetry: Sinus rhythm    EKG: Sinus rhythm with old IMI    []  No new EKG for review. Prior to Admission medications    Medication Sig Start Date End Date Taking? Authorizing Provider   losartan (COZAAR) 50 mg tablet  8/22/22   Other, MD Elisabet   simvastatin (ZOCOR) 40 mg tablet Take  by mouth nightly. Provider, Historical   metoprolol succinate 100 mg CSpX Take 100 mg by mouth daily. Provider, Historical   clopidogreL (PLAVIX) 75 mg tab Take 75 mg by mouth daily. Provider, Historical   aspirin delayed-release 81 mg tablet Take 81 mg by mouth daily. Provider, Historical       Recent Results (from the past 24 hour(s))   EKG, 12 LEAD, INITIAL    Collection Time: 08/27/22  6:19 PM   Result Value Ref Range    Ventricular Rate 85 BPM    Atrial Rate 85 BPM    P-R Interval 133 ms    QRS Duration 106 ms    Q-T Interval 374 ms    QTC Calculation (Bezet) 445 ms    Calculated P Axis -5 degrees    Calculated R Axis 8 degrees    Calculated T Axis 175 degrees    Diagnosis       Sinus rhythm  Nonspecific repol abnormality, diffuse leads     CBC WITH AUTOMATED DIFF    Collection Time: 08/27/22  6:30 PM   Result Value Ref Range    WBC 9.6 4.4 - 11.3 K/uL    RBC 4.95 4.50 - 5.90 M/uL    HGB 15.0 13.5 - 17.5 g/dL    HCT 44.6 41 - 53 %    MCV 90.0 80 - 100 FL    MCH 30.4 (L) 31 - 34 PG    MCHC 33.7 31.0 - 36.0 g/dL    RDW 13.5 11.5 - 14.5 %    PLATELET 173 651 - 113 K/uL    MPV 9.1 6.5 - 11.5 FL    NRBC 0.1  WBC    ABSOLUTE NRBC 0.01 K/uL    NEUTROPHILS 69 42 - 75 %    LYMPHOCYTES 22 20.5 - 51.1 %    MONOCYTES 6 1.7 - 9.3 %    EOSINOPHILS 2 0.9 - 2.9 %    BASOPHILS 1 0.0 - 2.5 %    ABS. NEUTROPHILS 6.7 1.8 - 7.7 K/UL    ABS. LYMPHOCYTES 2.1 1.0 - 4.8 K/UL    ABS. MONOCYTES 0.5 0.2 - 2.4 K/UL    ABS. EOSINOPHILS 0.2 0.0 - 0.7 K/UL    ABS.  BASOPHILS 0.1 0.0 - 0.2 K/UL   TROPONIN-HIGH SENSITIVITY    Collection Time: 08/27/22  6:30 PM   Result Value Ref Range    Troponin-High Sensitivity 262 (HH) 0 - 76 ng/L   METABOLIC PANEL, COMPREHENSIVE    Collection Time: 08/27/22  6:30 PM   Result Value Ref Range    Sodium 140 136 - 145 mmol/L    Potassium 3.3 (L) 3.5 - 5.1 mmol/L    Chloride 104 97 - 108 mmol/L    CO2 26 21 - 32 mmol/L    Anion gap 10 5 - 15 mmol/L    Glucose 155 (H) 65 - 100 mg/dL    BUN 15 6 - 20 mg/dL    Creatinine 0.78 0.70 - 1.30 mg/dL    BUN/Creatinine ratio 19 12 - 20      GFR est AA >60 >60 ml/min/1.73m2    GFR est non-AA >60 >60 ml/min/1.73m2    Calcium 9.1 8.5 - 10.1 mg/dL    Bilirubin, total 0.2 0.2 - 1.0 mg/dL    AST (SGOT) 15 15 - 37 U/L    ALT (SGPT) 21 12 - 78 U/L    Alk.  phosphatase 105 45 - 117 U/L    Protein, total 7.2 6.4 - 8.2 g/dL    Albumin 3.9 3.5 - 5.0 g/dL    Globulin 3.3 2.0 - 4.0 g/dL    A-G Ratio 1.2 1.1 - 2.2     PTT    Collection Time: 08/27/22  6:30 PM   Result Value Ref Range    aPTT 26.5 21.2 - 34.1 sec    aPTT, therapeutic range   82 - 109 sec   EKG, 12 LEAD, INITIAL    Collection Time: 08/27/22  9:18 PM   Result Value Ref Range    Ventricular Rate 74 BPM    Atrial Rate 74 BPM    P-R Interval 132 ms    QRS Duration 96 ms    Q-T Interval 388 ms    QTC Calculation (Bezet) 430 ms    Calculated P Axis 15 degrees    Calculated R Axis 25 degrees    Calculated T Axis 24 degrees    Diagnosis       Normal sinus rhythm  Possible Inferior infarct , age undetermined  Abnormal ECG  When compared with ECG of 27-AUG-2022 18:19,  No significant change was found  Confirmed by HOSP WALTERS, [address] [facility] ([id number]) on 8/28/2022 10:26:53 AM     PTT    Collection Time: 08/27/22 10:24 PM   Result Value Ref Range    aPTT 47.5 (H) 21.2 - 34.1 sec    aPTT, therapeutic range   82 - 109 sec   CBC WITH AUTOMATED DIFF    Collection Time: 08/27/22 10:24 PM   Result Value Ref Range    WBC 9.9 4.1 - 11.1 K/uL    RBC 4.71 4.10 - 5.70 M/uL    HGB 14.4 12.1 - 17.0 g/dL    HCT 41.7 36.6 - 50.3 %    MCV 88.5 80.0 - 99.0 FL    MCH 30.6 26.0 - 34.0 PG    MCHC 34.5 30.0 - 36.5 g/dL    RDW 13.2 11.5 - 14.5 %    PLATELET 254 150 - 400 K/uL    MPV 10.8 8.9 - 12.9 FL    NRBC 0.0 0.0  WBC    ABSOLUTE NRBC 0.00 0.00 - 0.01 K/uL    NEUTROPHILS 74 32 - 75 %    LYMPHOCYTES 19 12 - 49 %    MONOCYTES 5 5 - 13 %    EOSINOPHILS 1 0 - 7 %    BASOPHILS 1 0 - 1 %    IMMATURE GRANULOCYTES 0 0 - 0.5 %    ABS. NEUTROPHILS 7.4 1.8 - 8.0 K/UL    ABS. LYMPHOCYTES 1.8 0.8 - 3.5 K/UL    ABS. MONOCYTES 0.5 0.0 - 1.0 K/UL    ABS. EOSINOPHILS 0.1 0.0 - 0.4 K/UL    ABS. BASOPHILS 0.1 0.0 - 0.1 K/UL    ABS. IMM.  GRANS. 0.0 0.00 - 0.04 K/UL    DF AUTOMATED     TROPONIN-HIGH SENSITIVITY    Collection Time: 08/27/22 10:24 PM   Result Value Ref Range    Troponin-High Sensitivity 504 (HH) 0 - 76 ng/L   METABOLIC PANEL, BASIC    Collection Time: 08/28/22  4:59 AM   Result Value Ref Range    Sodium 143 136 - 145 mmol/L    Potassium 3.8 3.5 - 5.1 mmol/L    Chloride 112 (H) 97 - 108 mmol/L    CO2 26 21 - 32 mmol/L    Anion gap 5 5 - 15 mmol/L    Glucose 93 65 - 100 mg/dL    BUN 11 6 - 20 mg/dL    Creatinine 0.52 (L) 0.70 - 1.30 mg/dL    BUN/Creatinine ratio 21 (H) 12 - 20      GFR est AA >60 >60 ml/min/1.73m2    GFR est non-AA >60 >60 ml/min/1.73m2    Calcium 8.8 8.5 - 10.1 mg/dL   TROPONIN-HIGH SENSITIVITY    Collection Time: 08/28/22  4:59 AM   Result Value Ref Range    Troponin-High Sensitivity 3,015 (HH) 0 - 76 ng/L   PTT    Collection Time: 08/28/22  4:59 AM   Result Value Ref Range    aPTT 41.1 (H) 21.2 - 34.1 sec    aPTT, therapeutic range   82 - 109 sec   CK    Collection Time: 08/28/22  4:59 AM   Result Value Ref Range     39 - 308 U/L   ECHO ADULT COMPLETE    Collection Time: 08/28/22 10:11 AM   Result Value Ref Range    LA Minor Axis 5.4 cm    LA Major Axis 6.0 cm    LA Area 2C 23.3 cm2    LA Area 4C 21.6 cm2    LA Volume BP 74 (A) 18 - 58 mL    LA Diameter 3.3 cm    RA Area 4C 18.2 cm2    RA Volume 51 ml    Est. RA Pressure 3 mmHg    AV Peak Velocity 1.3 m/s    AV Peak Gradient 6 mmHg    AV Area by Peak Velocity 3.4 cm2    Aortic Root 4.1 cm IVSd 1.0 0.6 - 1.0 cm    LVIDd 5.6 4.2 - 5.9 cm    LVIDs 3.9 cm    LVOT Diameter 2.1 cm    LVOT Peak Velocity 1.3 m/s    LVOT Peak Gradient 6 mmHg    LVPWd 1.0 0.6 - 1.0 cm    LV E' Lateral Velocity 9 cm/s    LV E' Septal Velocity 6 cm/s    LVOT Area 3.5 cm2    MV E Wave Deceleration Time 180.0 ms    MV A Velocity 0.77 m/s    MV E Velocity 0.89 m/s    PV Max Velocity 1.2 m/s    PV Peak Gradient 6 mmHg    RVIDd 3.7 cm    TAPSE 2.3 1.7 cm    TR Max Velocity 2.61 m/s    TR Peak Gradient 27 mmHg    Fractional Shortening 2D 30 28 - 44 %    LVIDd Index 2.34 cm/m2    LVIDs Index 1.63 cm/m2    LV RWT Ratio 0.36     LV Mass 2D 219.7 88 - 224 g    LV Mass 2D Index 91.9 49 - 115 g/m2    MV E/A 1.16     E/E' Ratio (Averaged) 12.36     E/E' Lateral 9.89     E/E' Septal 14.83     LA Volume Index BP 31 16 - 34 ml/m2    LA Size Index 1.38 cm/m2    LA/AO Root Ratio 0.80     RA Volume Index A4C 21 mL/m2    Ao Root Index 1.72 cm/m2    AV Velocity Ratio 1.00     SARBJIT/BSA Peak Velocity 1.4 cm2/m2    RVSP 30 mmHg   PTT    Collection Time: 08/28/22 10:23 AM   Result Value Ref Range    aPTT 65.5 (H) 21.2 - 34.1 sec    aPTT, therapeutic range   82 - 109 sec        Caty Matute MD

## 2022-08-28 NOTE — PROGRESS NOTES
Problem: Falls - Risk of  Goal: *Absence of Falls  Description: Document Ab[place]amon Lorraine Fall Risk and appropriate interventions in the flowsheet.   Outcome: Progressing Towards Goal  Note: Fall Risk Interventions:            Medication Interventions: Assess postural VS orthostatic hypotension                   Problem: Patient Education: Go to Patient Education Activity  Goal: Patient/Family Education  Outcome: Progressing Towards Goal

## 2022-08-28 NOTE — H&P
Hospitalist History & Physical Notes. [facility]. Name : Clover Mckeon MRN number : [id number]     YOB: 1965     Subjective :   Chief Complaint : Chest pain with elevated troponin suggestive of non-ST elevation MI with a history of coronary artery disease    Source of information : Patient, records from referring facility, previous records. History of present illness:   56 y.o. male history of hypertension and coronary artery disease with a history of stents in 2013 and 2020 presents to the emergency room complaining of retrosternal chest pain radiating to the right side and to the shoulder. States he had an episode of yesterday evening, which improved. But today he started again with the pain which is not improved, radiating to the back. Denies any shortness of breath, nausea or vomiting. No diaphoresis. Its been intermittent in nature so presented to the emergency room. Found with elevated troponin suggestive of non-ST elevation MI, transferred here as there is no cardiac catheterization available at the referring facility. Patient denied any new complaints at this time. He was little reluctant to give the information. Past Medical History:   Diagnosis Date    CAD (coronary artery disease)     Hypertension      Past Surgical History:   Procedure Laterality Date    HX HEART CATHETERIZATION      x2 caths, stents placed patient states he believes 4 stents in total placed      Family History   Problem Relation Age of Onset    Hypertension Mother     Heart Disease Mother     Heart Disease Father     Hypertension Father       Social History     Tobacco Use    Smoking status: Every Day     Packs/day: 0.50     Types: Cigarettes    Smokeless tobacco: Never   Substance Use Topics    Alcohol use: Never       Prior to Admission medications    Medication Sig Start Date End Date Taking?  Authorizing Provider   losartan (COZAAR) 50 mg tablet  8/22/22   Other, MD Elisabet   simvastatin (ZOCOR) 40 mg tablet Take  by mouth nightly. Provider, Historical   metoprolol succinate 100 mg CSpX Take 100 mg by mouth daily. Provider, Historical   clopidogreL (PLAVIX) 75 mg tab Take 75 mg by mouth daily. Provider, Historical   aspirin delayed-release 81 mg tablet Take 81 mg by mouth daily. Provider, Historical     No Known Allergies          Review of Systems:  Constitutional: Appetite is good, denies weight loss, no fever, no chills, no night sweats. Eye: No recent visual disturbances, no discharge, no double vision. Ear/nose/mouth/throat : No hearing disturbance, no ear pain, no nasal congestion, no sore throat, no trouble swallowing. Respiratory : No trouble breathing, no cough, no shortness of breath, no hemoptysis, no wheezing. Cardiovascular : As in history of present illness, denies any palpitations, orthopnea or paroxysmal nocturnal dyspnea. gastrointestinal : No nausea, no vomiting, no diarrhea, No abdominal pain. Genitourinary : No dysuria, no hematuria, no increased frequency, No incontinence. Endocrine : No excessive thirst, No polyuria. Immunologic : No hives, urticaria, No seasonal allergies. Musculoskeletal : No joint swelling, No pain, No effusion,    Integumentary : No rash, No pruritus,   Hematology : No petechiae, No easy bruising,  No tendency to bleed easy. Neurology : Denies change in mental status, No abnormal balance,  No numbness or tingling. Psychiatric : No mood swings, No anxiety, No depression. Vitals:   Patient Vitals for the past 12 hrs:   Temp Pulse Resp BP SpO2   08/27/22 2319 -- 74 19 (!) 159/75 97 %   08/27/22 2214 -- 71 16 (!) 154/85 97 %   08/27/22 2120 98.2 °F (36.8 °C) 77 19 (!) 159/93 98 %       Physical Exam:   General : Looks comfortable, well-built no respiratory distress noted. HEENT : PERRLA, normal oral mucosa, atraumatic normocephalic, Normal ear and nose.   Neck : Supple, no JVD, no masses noted, no carotid bruit.  Lungs : Breath sounds with good air entry bilaterally, no wheezes or rales, no accessory muscle use. CVS : Rhythm rate regular, S1+, S2+, no murmur or gallop. Abdomen : Soft, nontender,  bowel sounds active. Extremities : No edema noted,  pedal pulses palpable. Musculoskeletal : Fair range of motion, no joint swelling or effusion, muscle tone and power appears normal.   Skin : Moist, warm, no pathological rash. Lymphatic : No cervical lymphadenopathy. Neurological : Awake, alert, oriented to time place person. Psychiatric : Mood and affect appears appropriate to the situation. Data Review:   Recent Results (from the past 24 hour(s))   EKG, 12 LEAD, INITIAL    Collection Time: 08/27/22  6:19 PM   Result Value Ref Range    Ventricular Rate 85 BPM    Atrial Rate 85 BPM    P-R Interval 133 ms    QRS Duration 106 ms    Q-T Interval 374 ms    QTC Calculation (Bezet) 445 ms    Calculated P Axis -5 degrees    Calculated R Axis 8 degrees    Calculated T Axis 175 degrees    Diagnosis       Sinus rhythm  Nonspecific repol abnormality, diffuse leads     CBC WITH AUTOMATED DIFF    Collection Time: 08/27/22  6:30 PM   Result Value Ref Range    WBC 9.6 4.4 - 11.3 K/uL    RBC 4.95 4.50 - 5.90 M/uL    HGB 15.0 13.5 - 17.5 g/dL    HCT 44.6 41 - 53 %    MCV 90.0 80 - 100 FL    MCH 30.4 (L) 31 - 34 PG    MCHC 33.7 31.0 - 36.0 g/dL    RDW 13.5 11.5 - 14.5 %    PLATELET 361 913 - 297 K/uL    MPV 9.1 6.5 - 11.5 FL    NRBC 0.1  WBC    ABSOLUTE NRBC 0.01 K/uL    NEUTROPHILS 69 42 - 75 %    LYMPHOCYTES 22 20.5 - 51.1 %    MONOCYTES 6 1.7 - 9.3 %    EOSINOPHILS 2 0.9 - 2.9 %    BASOPHILS 1 0.0 - 2.5 %    ABS. NEUTROPHILS 6.7 1.8 - 7.7 K/UL    ABS. LYMPHOCYTES 2.1 1.0 - 4.8 K/UL    ABS. MONOCYTES 0.5 0.2 - 2.4 K/UL    ABS. EOSINOPHILS 0.2 0.0 - 0.7 K/UL    ABS.  BASOPHILS 0.1 0.0 - 0.2 K/UL   TROPONIN-HIGH SENSITIVITY    Collection Time: 08/27/22  6:30 PM   Result Value Ref Range    Troponin-High Sensitivity 262 (HH) 0 - 76 ng/L   METABOLIC PANEL, COMPREHENSIVE    Collection Time: 08/27/22  6:30 PM   Result Value Ref Range    Sodium 140 136 - 145 mmol/L    Potassium 3.3 (L) 3.5 - 5.1 mmol/L    Chloride 104 97 - 108 mmol/L    CO2 26 21 - 32 mmol/L    Anion gap 10 5 - 15 mmol/L    Glucose 155 (H) 65 - 100 mg/dL    BUN 15 6 - 20 mg/dL    Creatinine 0.78 0.70 - 1.30 mg/dL    BUN/Creatinine ratio 19 12 - 20      GFR est AA >60 >60 ml/min/1.73m2    GFR est non-AA >60 >60 ml/min/1.73m2    Calcium 9.1 8.5 - 10.1 mg/dL    Bilirubin, total 0.2 0.2 - 1.0 mg/dL    AST (SGOT) 15 15 - 37 U/L    ALT (SGPT) 21 12 - 78 U/L    Alk. phosphatase 105 45 - 117 U/L    Protein, total 7.2 6.4 - 8.2 g/dL    Albumin 3.9 3.5 - 5.0 g/dL    Globulin 3.3 2.0 - 4.0 g/dL    A-G Ratio 1.2 1.1 - 2.2     PTT    Collection Time: 08/27/22  6:30 PM   Result Value Ref Range    aPTT 26.5 21.2 - 34.1 sec    aPTT, therapeutic range   82 - 109 sec   PTT    Collection Time: 08/27/22 10:24 PM   Result Value Ref Range    aPTT 47.5 (H) 21.2 - 34.1 sec    aPTT, therapeutic range   82 - 109 sec   CBC WITH AUTOMATED DIFF    Collection Time: 08/27/22 10:24 PM   Result Value Ref Range    WBC 9.9 4.1 - 11.1 K/uL    RBC 4.71 4.10 - 5.70 M/uL    HGB 14.4 12.1 - 17.0 g/dL    HCT 41.7 36.6 - 50.3 %    MCV 88.5 80.0 - 99.0 FL    MCH 30.6 26.0 - 34.0 PG    MCHC 34.5 30.0 - 36.5 g/dL    RDW 13.2 11.5 - 14.5 %    PLATELET 697 651 - 668 K/uL    MPV 10.8 8.9 - 12.9 FL    NRBC 0.0 0.0  WBC    ABSOLUTE NRBC 0.00 0.00 - 0.01 K/uL    NEUTROPHILS 74 32 - 75 %    LYMPHOCYTES 19 12 - 49 %    MONOCYTES 5 5 - 13 %    EOSINOPHILS 1 0 - 7 %    BASOPHILS 1 0 - 1 %    IMMATURE GRANULOCYTES 0 0 - 0.5 %    ABS. NEUTROPHILS 7.4 1.8 - 8.0 K/UL    ABS. LYMPHOCYTES 1.8 0.8 - 3.5 K/UL    ABS. MONOCYTES 0.5 0.0 - 1.0 K/UL    ABS. EOSINOPHILS 0.1 0.0 - 0.4 K/UL    ABS. BASOPHILS 0.1 0.0 - 0.1 K/UL    ABS. IMM.  GRANS. 0.0 0.00 - 0.04 K/UL    DF AUTOMATED         Radiologic Studies :       CXR Results (Last 48 hours)                 08/27/22 1855  XR CHEST PORT Final result    Impression:  No acute cardiopulmonary process       Narrative:  EXAM: XR CHEST PORT       INDICATION: cp       COMPARISON: 9/25/2020       FINDINGS: A portable AP radiograph of the chest was obtained at 1852 hours. Patient's on cardiac monitor. . The lungs are clear. The cardiac and mediastinal   contours and pulmonary vascularity are normal.  The bones and soft tissues are   grossly within normal limits. Assessment and Plan :     Non-ST elevation MI: He is already started on IV heparin which we will continue, trend troponin, cardiology consultation and echocardiogram.    Benign essential hypertension: Continue home medications with no change, on metoprolol succinate 100 mg daily and Cozaar 50 mg daily. History of coronary artery disease with stent placement in the past    Admitted to cardiac telemetry, full CODE STATUS, home medications reviewed and verified with the list he provided. CC : Rosio Sanchez MD  Signed By: Rachel Zee MD     August 27, 2022      This dictation was done by dragon, computer voice recognition software. Often unanticipated grammatical, syntax, [place] phones and other interpretive errors are inadvertently transcribed. Please excuse errors that have escaped final proofreading.

## 2022-08-28 NOTE — PROGRESS NOTES
Problem: Falls - Risk of  Goal: *Absence of Falls  Description: Document Amy Urrutia Fall Risk and appropriate interventions in the flowsheet.   8/28/2022 0125 by Moises Rene RN  Outcome: Progressing Towards Goal  Note: Fall Risk Interventions:            Medication Interventions: Assess postural VS orthostatic hypotension                8/28/2022 0120 by Moises Rene RN  Outcome: Progressing Towards Goal  Note: Fall Risk Interventions:            Medication Interventions: Assess postural VS orthostatic hypotension

## 2022-08-29 LAB
ACT BLD: 190 SEC (ref 74–125)
ACT BLD: 225 SEC (ref 74–125)
ACT BLD: 283 SEC (ref 74–125)
APTT PPP: 80.8 SEC (ref 21.2–34.1)
APTT PPP: 93.9 SEC (ref 21.2–34.1)
APTT PPP: 97.4 SEC (ref 21.2–34.1)
ATRIAL RATE: 68 BPM
ATRIAL RATE: 85 BPM
CALCULATED P AXIS, ECG09: -5 DEGREES
CALCULATED P AXIS, ECG09: 5 DEGREES
CALCULATED R AXIS, ECG10: 13 DEGREES
CALCULATED R AXIS, ECG10: 8 DEGREES
CALCULATED T AXIS, ECG11: -8 DEGREES
CALCULATED T AXIS, ECG11: 175 DEGREES
DIAGNOSIS, 93000: NORMAL
DIAGNOSIS, 93000: NORMAL
P-R INTERVAL, ECG05: 133 MS
P-R INTERVAL, ECG05: 138 MS
PERFORMED BY, TECHID: ABNORMAL
Q-T INTERVAL, ECG07: 374 MS
Q-T INTERVAL, ECG07: 410 MS
QRS DURATION, ECG06: 106 MS
QRS DURATION, ECG06: 106 MS
QTC CALCULATION (BEZET), ECG08: 435 MS
QTC CALCULATION (BEZET), ECG08: 445 MS
THERAPEUTIC RANGE,PTTT: ABNORMAL SEC (ref 82–109)
TROPONIN-HIGH SENSITIVITY: 1484 NG/L (ref 0–76)
VENTRICULAR RATE, ECG03: 68 BPM
VENTRICULAR RATE, ECG03: 85 BPM

## 2022-08-29 PROCEDURE — C1769 GUIDE WIRE: HCPCS | Performed by: INTERNAL MEDICINE

## 2022-08-29 PROCEDURE — C1887 CATHETER, GUIDING: HCPCS | Performed by: INTERNAL MEDICINE

## 2022-08-29 PROCEDURE — 84484 ASSAY OF TROPONIN QUANT: CPT

## 2022-08-29 PROCEDURE — 77030015766: Performed by: INTERNAL MEDICINE

## 2022-08-29 PROCEDURE — 74011000636 HC RX REV CODE- 636: Performed by: INTERNAL MEDICINE

## 2022-08-29 PROCEDURE — 36415 COLL VENOUS BLD VENIPUNCTURE: CPT

## 2022-08-29 PROCEDURE — 85347 COAGULATION TIME ACTIVATED: CPT

## 2022-08-29 PROCEDURE — 74011000250 HC RX REV CODE- 250: Performed by: HOSPITALIST

## 2022-08-29 PROCEDURE — 74011250637 HC RX REV CODE- 250/637: Performed by: HOSPITALIST

## 2022-08-29 PROCEDURE — 77030008542 HC TBNG MON PRSS EDWD -A: Performed by: INTERNAL MEDICINE

## 2022-08-29 PROCEDURE — 93458 L HRT ARTERY/VENTRICLE ANGIO: CPT | Performed by: INTERNAL MEDICINE

## 2022-08-29 PROCEDURE — C1894 INTRO/SHEATH, NON-LASER: HCPCS | Performed by: INTERNAL MEDICINE

## 2022-08-29 PROCEDURE — 77030013516 HC DEV INFL ANGI MRTM -B: Performed by: INTERNAL MEDICINE

## 2022-08-29 PROCEDURE — 85730 THROMBOPLASTIN TIME PARTIAL: CPT

## 2022-08-29 PROCEDURE — 77030004558 HC CATH ANGI DX SUPR TORQ CARD -A: Performed by: INTERNAL MEDICINE

## 2022-08-29 PROCEDURE — 74011250636 HC RX REV CODE- 250/636: Performed by: PHYSICIAN ASSISTANT

## 2022-08-29 PROCEDURE — 77030040934 HC CATH DIAG DXTERITY MEDT -A: Performed by: INTERNAL MEDICINE

## 2022-08-29 PROCEDURE — 2709999900 HC NON-CHARGEABLE SUPPLY: Performed by: INTERNAL MEDICINE

## 2022-08-29 PROCEDURE — 77030025703 HC SYR ANGI VACLOK MRTM -A: Performed by: INTERNAL MEDICINE

## 2022-08-29 PROCEDURE — C1725 CATH, TRANSLUMIN NON-LASER: HCPCS | Performed by: INTERNAL MEDICINE

## 2022-08-29 PROCEDURE — 76210000006 HC OR PH I REC 0.5 TO 1 HR: Performed by: INTERNAL MEDICINE

## 2022-08-29 PROCEDURE — C1874 STENT, COATED/COV W/DEL SYS: HCPCS | Performed by: INTERNAL MEDICINE

## 2022-08-29 PROCEDURE — 74011250637 HC RX REV CODE- 250/637: Performed by: INTERNAL MEDICINE

## 2022-08-29 PROCEDURE — 77030019569 HC BND COMPR RAD TERU -B: Performed by: INTERNAL MEDICINE

## 2022-08-29 PROCEDURE — 99153 MOD SED SAME PHYS/QHP EA: CPT | Performed by: INTERNAL MEDICINE

## 2022-08-29 PROCEDURE — 99152 MOD SED SAME PHYS/QHP 5/>YRS: CPT | Performed by: INTERNAL MEDICINE

## 2022-08-29 PROCEDURE — 77030012468 HC VLV BLEEDBK CNTRL ABBT -B: Performed by: INTERNAL MEDICINE

## 2022-08-29 PROCEDURE — 74011000250 HC RX REV CODE- 250: Performed by: INTERNAL MEDICINE

## 2022-08-29 PROCEDURE — 65270000029 HC RM PRIVATE

## 2022-08-29 PROCEDURE — 77030016699 HC CATH ANGI DX INFN1 CARD -A: Performed by: INTERNAL MEDICINE

## 2022-08-29 PROCEDURE — 74011250636 HC RX REV CODE- 250/636: Performed by: INTERNAL MEDICINE

## 2022-08-29 PROCEDURE — 92928 PRQ TCAT PLMT NTRAC ST 1 LES: CPT | Performed by: INTERNAL MEDICINE

## 2022-08-29 DEVICE — STENT RONYX27526UX RESOLUTE ONYX 2.75X26
Type: IMPLANTABLE DEVICE | Status: FUNCTIONAL
Brand: RESOLUTE ONYX™

## 2022-08-29 RX ORDER — LIDOCAINE HYDROCHLORIDE 10 MG/ML
INJECTION INFILTRATION; PERINEURAL AS NEEDED
Status: DISCONTINUED | OUTPATIENT
Start: 2022-08-29 | End: 2022-08-29 | Stop reason: HOSPADM

## 2022-08-29 RX ORDER — MIDAZOLAM HYDROCHLORIDE 1 MG/ML
INJECTION INTRAMUSCULAR; INTRAVENOUS AS NEEDED
Status: DISCONTINUED | OUTPATIENT
Start: 2022-08-29 | End: 2022-08-29 | Stop reason: HOSPADM

## 2022-08-29 RX ORDER — SODIUM CHLORIDE 9 MG/ML
INJECTION, SOLUTION INTRAVENOUS
Status: COMPLETED | OUTPATIENT
Start: 2022-08-29 | End: 2022-08-29

## 2022-08-29 RX ORDER — HEPARIN SODIUM 200 [USP'U]/100ML
INJECTION, SOLUTION INTRAVENOUS
Status: COMPLETED | OUTPATIENT
Start: 2022-08-29 | End: 2022-08-29

## 2022-08-29 RX ORDER — NITROGLYCERIN 5 MG/ML
INJECTION, SOLUTION INTRAVENOUS AS NEEDED
Status: DISCONTINUED | OUTPATIENT
Start: 2022-08-29 | End: 2022-08-29 | Stop reason: HOSPADM

## 2022-08-29 RX ORDER — FENTANYL CITRATE 50 UG/ML
INJECTION, SOLUTION INTRAMUSCULAR; INTRAVENOUS AS NEEDED
Status: DISCONTINUED | OUTPATIENT
Start: 2022-08-29 | End: 2022-08-29 | Stop reason: HOSPADM

## 2022-08-29 RX ORDER — HEPARIN SODIUM 1000 [USP'U]/ML
INJECTION, SOLUTION INTRAVENOUS; SUBCUTANEOUS AS NEEDED
Status: DISCONTINUED | OUTPATIENT
Start: 2022-08-29 | End: 2022-08-29 | Stop reason: HOSPADM

## 2022-08-29 RX ORDER — VERAPAMIL HYDROCHLORIDE 2.5 MG/ML
INJECTION, SOLUTION INTRAVENOUS AS NEEDED
Status: DISCONTINUED | OUTPATIENT
Start: 2022-08-29 | End: 2022-08-29 | Stop reason: HOSPADM

## 2022-08-29 RX ORDER — CLOPIDOGREL 300 MG/1
TABLET, FILM COATED ORAL AS NEEDED
Status: DISCONTINUED | OUTPATIENT
Start: 2022-08-29 | End: 2022-08-29 | Stop reason: HOSPADM

## 2022-08-29 RX ADMIN — ASPIRIN 81 MG: 81 TABLET, COATED ORAL at 08:14

## 2022-08-29 RX ADMIN — SODIUM CHLORIDE, PRESERVATIVE FREE 10 ML: 5 INJECTION INTRAVENOUS at 05:01

## 2022-08-29 RX ADMIN — ATORVASTATIN CALCIUM 80 MG: 40 TABLET, FILM COATED ORAL at 20:28

## 2022-08-29 RX ADMIN — HEPARIN SODIUM AND DEXTROSE 16 UNITS/KG/HR: 10000; 5 INJECTION INTRAVENOUS at 05:32

## 2022-08-29 RX ADMIN — SODIUM CHLORIDE, PRESERVATIVE FREE 10 ML: 5 INJECTION INTRAVENOUS at 20:29

## 2022-08-29 RX ADMIN — LOSARTAN POTASSIUM 50 MG: 50 TABLET, FILM COATED ORAL at 08:14

## 2022-08-29 RX ADMIN — ACETAMINOPHEN AND CODEINE PHOSPHATE 2 TABLET: 300; 30 TABLET ORAL at 20:28

## 2022-08-29 RX ADMIN — CLOPIDOGREL BISULFATE 75 MG: 75 TABLET ORAL at 08:14

## 2022-08-29 RX ADMIN — METOPROLOL SUCCINATE 100 MG: 50 TABLET, EXTENDED RELEASE ORAL at 08:14

## 2022-08-29 NOTE — PROGRESS NOTES
Problem: Falls - Risk of  Goal: *Absence of Falls  Description: Document Etienne Lane Fall Risk and appropriate interventions in the flowsheet.   Outcome: Progressing Towards Goal  Note: Fall Risk Interventions:            Medication Interventions: Assess postural VS orthostatic hypotension                   Problem: Patient Education: Go to Patient Education Activity  Goal: Patient/Family Education  Outcome: Progressing Towards Goal

## 2022-08-29 NOTE — PROGRESS NOTES
Hospitalist Progress Note    Subjective:   Daily Progress Note: 8/29/2022 12:43 PM    Hospital Course:  56years old male with hypertension, CAD s/p stent in 2013, and 2020 presented to ED with c/o retrosternal chest pain, radiating to right side. Presented to [facility], was found to have elevated troponin suggestive of NSTEMI, transferred to our hospital as there is no cardiac catheterization available at the referring facility. Patient was started on heparin drip. Subjective:  Patient reports that his chest pain is better. Denies any new complaints. Plan for cardiac cath today.     Current Facility-Administered Medications   Medication Dose Route Frequency    atorvastatin (LIPITOR) tablet 80 mg  80 mg Oral QHS    acetaminophen-codeine (TYLENOL #3) per tablet 2 Tablet  2 Tablet Oral Q6H PRN    heparin 25,000 units in D5W 250 ml infusion  12-25 Units/kg/hr IntraVENous TITRATE    heparin (porcine) 1,000 unit/mL injection 4,000 Units  4,000 Units IntraVENous PRN    Or    heparin (porcine) 1,000 unit/mL injection 2,000 Units  2,000 Units IntraVENous PRN    clopidogreL (PLAVIX) tablet 75 mg  75 mg Oral DAILY    aspirin delayed-release tablet 81 mg  81 mg Oral DAILY    losartan (COZAAR) tablet 50 mg  50 mg Oral DAILY    metoprolol succinate (TOPROL-XL) XL tablet 100 mg  100 mg Oral DAILY    sodium chloride (NS) flush 5-40 mL  5-40 mL IntraVENous Q8H    sodium chloride (NS) flush 5-40 mL  5-40 mL IntraVENous PRN    acetaminophen (TYLENOL) tablet 650 mg  650 mg Oral Q4H PRN    ondansetron (ZOFRAN ODT) tablet 4 mg  4 mg Oral Q6H PRN        Review of Systems  Constitutional: No fevers, No chills, No sweats, No fatigue, No Weakness  Eyes: No redness  Ears, nose, mouth, throat, and face: No nasal congestion, No sore throat, No voice change  Respiratory: No Shortness of Breath, No cough, No wheezing  Cardiovascular: Improving chest pain, No palpitations, No extremity edema  Gastrointestinal: No nausea, No vomiting, No diarrhea, No abdominal pain  Genitourinary: No frequency, No dysuria, No hematuria  Integument/breast: No skin lesion(s)   Neurological: No Confusion, No headaches, No dizziness      Objective:     Visit Vitals  BP (!) 142/78 (BP Patient Position: At rest;Lying)   Pulse 74   Temp 98.1 °F (36.7 °C)   Resp 18   Ht 6' 1\" (1.854 m)   Wt 115.7 kg (255 lb)   SpO2 97%   BMI 33.64 kg/m²      O2 Device: None (Room air)    Temp (24hrs), Av.2 °F (36.8 °C), Min:97.8 °F (36.6 °C), Max:98.8 °F (37.1 °C)      No intake/output data recorded. No intake/output data recorded. PHYSICAL EXAM:  Constitutional: No acute distress  Skin: Extremities and face reveal no rashes. HEENT: Sclerae anicteric. Extra-occular muscles are intact. No oral ulcers. The neck is supple and no masses. Cardiovascular: Regular rate and rhythm. Respiratory:  Clear breath sounds bilaterally with no wheezes, rales, or rhonchi. GI: Abdomen nondistended, soft, and nontender. Normal active bowel sounds. Musculoskeletal: No pitting edema of the lower legs. Able to move all ext  Neurological:  Patient is alert and oriented.  Cranial nerves II-XII grossly intact  Psychiatric: Mood appears appropriate       Data Review    Recent Results (from the past 24 hour(s))   PTT    Collection Time: 22  2:35 PM   Result Value Ref Range    aPTT 55.1 (H) 21.2 - 34.1 sec    aPTT, therapeutic range   sec   EKG, 12 LEAD, SUBSEQUENT    Collection Time: 22  3:15 PM   Result Value Ref Range    Ventricular Rate 68 BPM    Atrial Rate 68 BPM    P-R Interval 138 ms    QRS Duration 106 ms    Q-T Interval 410 ms    QTC Calculation (Bezet) 435 ms    Calculated P Axis 5 degrees    Calculated R Axis 13 degrees    Calculated T Axis -8 degrees    Diagnosis       Normal sinus rhythm  Inferior infarct (cited on or before 27-AUG-2022)  Abnormal ECG  When compared with ECG of 27-AUG-2022 21:18,  No significant change was found  Confirmed by Melquiades Ugalde ([id number]) on 8/29/2022 9:00:07 AM     PTT    Collection Time: 08/28/22 11:12 PM   Result Value Ref Range    aPTT 97.4 (H) 21.2 - 34.1 sec    aPTT, therapeutic range   82 - 109 sec   TROPONIN-HIGH SENSITIVITY    Collection Time: 08/29/22  5:31 AM   Result Value Ref Range    Troponin-High Sensitivity 1,484 (HH) 0 - 76 ng/L   PTT    Collection Time: 08/29/22  5:31 AM   Result Value Ref Range    aPTT 93.9 (H) 21.2 - 34.1 sec    aPTT, therapeutic range   82 - 109 sec           Assessment / Plan:  NSTEMI:  Continue heparin drip  Appreciate cardiology consult  ECHO reviewed- moderate hypokinesis  Continue aspirin, plavix and statin. Plan for cardiac cath    Hypertension:  Continue metoprolol 100 mg po daily and Cozaar 50 mg po daily    Hx of CAD s/p stent        30.0 - 39.9 Obese / Body mass index is 33.64 kg/m². Code status: Full  Prophylaxis:  on heparin drip  Recommended Disposition: Home w/Family      time spent 35 minutes involving direct patient care as well as reviewing patient's labs and coordination of care with nursing staff     Care Plan discussed with: Patient/Family/RN/Case Management        Total time spent with patient: 35 minutes.

## 2022-08-30 VITALS
HEART RATE: 66 BPM | DIASTOLIC BLOOD PRESSURE: 76 MMHG | RESPIRATION RATE: 18 BRPM | TEMPERATURE: 98.2 F | WEIGHT: 255 LBS | BODY MASS INDEX: 33.8 KG/M2 | HEIGHT: 73 IN | SYSTOLIC BLOOD PRESSURE: 155 MMHG | OXYGEN SATURATION: 98 %

## 2022-08-30 PROCEDURE — 74011000250 HC RX REV CODE- 250: Performed by: HOSPITALIST

## 2022-08-30 PROCEDURE — 74011250637 HC RX REV CODE- 250/637: Performed by: HOSPITALIST

## 2022-08-30 RX ORDER — VARENICLINE TARTRATE 25 MG
KIT ORAL
Qty: 1 DOSE PACK | Refills: 0 | Status: SHIPPED | OUTPATIENT
Start: 2022-08-30

## 2022-08-30 RX ORDER — ATORVASTATIN CALCIUM 80 MG/1
80 TABLET, FILM COATED ORAL
Qty: 30 TABLET | Refills: 0 | Status: SHIPPED | OUTPATIENT
Start: 2022-08-30 | End: 2022-09-29

## 2022-08-30 RX ADMIN — ASPIRIN 81 MG: 81 TABLET, COATED ORAL at 08:48

## 2022-08-30 RX ADMIN — METOPROLOL SUCCINATE 100 MG: 50 TABLET, EXTENDED RELEASE ORAL at 08:49

## 2022-08-30 RX ADMIN — LOSARTAN POTASSIUM 50 MG: 50 TABLET, FILM COATED ORAL at 08:49

## 2022-08-30 RX ADMIN — CLOPIDOGREL BISULFATE 75 MG: 75 TABLET ORAL at 08:49

## 2022-08-30 RX ADMIN — SODIUM CHLORIDE, PRESERVATIVE FREE 10 ML: 5 INJECTION INTRAVENOUS at 06:59

## 2022-08-30 NOTE — DISCHARGE SUMMARY
Admit date: 8/27/2022   Admitting Provider: Juana Sykes MD    Discharge date: 8/30/2022  Discharging Provider: Kassandra Blas MD      * Admission Diagnoses: NSTEMI, initial episode of care [facility]) [I21.4]    * Discharge Diagnoses:    Hospital Problems as of 8/30/2022 Date Reviewed: 8/30/2022            Codes Class Noted - Resolved POA    * (Principal) NSTEMI (non-ST elevated myocardial infarction) [facility]) ICD-10-CM: I21.4  ICD-9-CM: 410.70  8/27/2022 - Present Yes        NSTEMI, initial episode of care [facility]) ICD-10-CM: I21.4  ICD-9-CM: 410.71  8/27/2022 - Present Yes           * Hospital Course:   56years old male with hypertension, CAD s/p stent in 2013, and 2020 presented to ED with c/o retrosternal chest pain, radiating to right side. Presented to [facility], was found to have elevated troponin suggestive of NSTEMI, transferred to our hospital as there is no cardiac catheterization available at the referring facility. Patient was started on heparin drip. 8/28/22 echocardiogram  Result status: Final result       Left Ventricle: Mildly reduced left ventricular systolic function with a visually estimated EF of 45 - 50%. Left ventricle size is normal. Mildly increased wall thickness. Findings consistent with concentric hypertrophy. Moderate hypokinesis of the following segments: basal inferior, basal inferolateral, mid inferior and apical inferior. Right Ventricle: Not well visualized. Right ventricle size is normal.    Mitral Valve: Mild regurgitation. 8/29/22 Cardiac Catheterization/Percutaneous Coronary Intervention   Cardiologist:  Dr. Lala Matthews  Assistants: None   PreOpDiagnosis: NSTEMI   Findings/PostOp Diagnosis:   1. Single vessel coronary artery disease   Plan:   1. Routine post-PCI care to monitor for ischemic, arrhythmic and vascular complications. 2.  Aggressive medical therapy to include aspirin, clopidogrel, beta-blocker, ACE-inhibitor, statin.     3.  Risk factor modification including smoking cessation counseling if indicated.   4.  12 months DAPT recommended   Procedures:   Selective coronary angiography   Successful PTCA/PCI with a GINNY to the OM1   Patient variables:   Shock status: Dynamically stable  Procedure status: Urgent  Indication: NSTEMI  CHF Class: 1  Pre-procedure IABP: Not required    8/30/22 discharge home with outpatient cardiac rehab anticipated  Outpatient follow up with Dr Ponce Schwab dual anti platelet therapy  Discussed with Dr Nicole Sargent at bedside, replaced simvastatin with high dose atorvastatin  Start Chantix initial dose diana  Anticipate return to work after one week, cardiac rehab may adjust as needed    * Procedures:   Procedure(s):  LEFT HEART CATH / CORONARY ANGIOGRAPHY  PERCUTANEOUS CORONARY INTERVENTION  PERCUTANEOUS CORONARY INTERVENTION      Consults: Cardiology    Significant Diagnostic Studies:   Recent Results (from the past 24 hour(s))   PTT    Collection Time: 08/29/22 12:50 PM   Result Value Ref Range    aPTT 80.8 (H) 21.2 - 34.1 sec    aPTT, therapeutic range   82 - 109 sec   POC ACTIVATED CLOTTING TIME    Collection Time: 08/29/22  3:01 PM   Result Value Ref Range    Activated Clotting Time (POC) 190 (H) 74 - 125 sec    Performed by Win Wolf    [address] [place] [address]    Collection Time: 08/29/22  3:12 PM   Result Value Ref Range    Activated Clotting Time (POC) 225 (H) 74 - 125 sec    Performed by Win Wolf    POC ACTIVATED CLOTTING TIME    Collection Time: 08/29/22  3:23 PM   Result Value Ref Range    Activated Clotting Time (POC) 283 (H) 74 - 125 sec    Performed by Tevin Hermosillo          Discharge Exam:  Patient Vitals for the past 24 hrs:   Temp Pulse Resp BP SpO2   08/30/22 1051 98.2 °F (36.8 °C) 66 18 (!) 155/76 98 %   08/30/22 0822 97.5 °F (36.4 °C) 77 19 (!) 160/87 100 %   08/30/22 0458 97.7 °F (36.5 °C) 70 18 125/74 99 %   08/30/22 0047 97.6 °F (36.4 °C) 72 18 (!) 153/78 97 %   08/29/22 2112 98 °F (36.7 °C) 72 18 (!) 149/85 96 %   08/29/22 1604 -- 74 18 (!) 161/83 --   08/29/22 1603 -- 75 16 (!) 165/74 98 %   08/29/22 1600 -- 74 -- -- --   08/29/22 1200 -- 74 -- -- --   08/29/22 1129 98.1 °F (36.7 °C) 74 18 (!) 142/78 97 %   Constitutional: No acute distress  Skin: Extremities and face reveal no rashes. HEENT: Sclerae anicteric. Extra-occular muscles are intact. No oral ulcers. The neck is supple and no masses. Cardiovascular: Regular rate and rhythm. Respiratory:  Clear breath sounds bilaterally with no wheezes, rales, or rhonchi. GI: Abdomen nondistended, soft, and nontender. Normal active bowel sounds. Musculoskeletal: No pitting edema of the lower legs. Able to move all ext  Neurological:  Patient is alert and oriented. Cranial nerves II-XII grossly intact  Psychiatric: Mood appears appropriate       * Discharge Condition: stable  * Disposition: Home    Discharge Medications:  Current Discharge Medication List        START taking these medications    Details   atorvastatin (LIPITOR) 80 mg tablet Take 1 Tablet by mouth nightly for 30 days. Qty: 30 Tablet, Refills: 0  Start date: 8/30/2022, End date: 9/29/2022      varenicline (Chantix Starting Month Box) 0.5 mg (11)- 1 mg (42) DsPk Use as directed on the dosepak  Qty: 1 Dose Pack, Refills: 0  Start date: 8/30/2022           CONTINUE these medications which have NOT CHANGED    Details   losartan (COZAAR) 50 mg tablet       metoprolol succinate 100 mg CSpX Take 100 mg by mouth daily. clopidogreL (PLAVIX) 75 mg tab Take 75 mg by mouth daily. aspirin delayed-release 81 mg tablet Take 81 mg by mouth daily. STOP taking these medications       simvastatin (ZOCOR) 40 mg tablet Comments:   Reason for Stopping:               * Follow-up Care/Patient Instructions:   Activity: Activity as tolerated and no driving for today  Diet: Cardiac Diet  Wound Care: Keep wound clean and dry    Follow-up Information       Follow up With Specialties Details Why Contact Kiera Funes MD [place] [phone] [address]      Priscilla Lei MD Cardiovascular Disease Physician Follow up in 2 week(s)  [place] [phone]  [phone]              Signed:  Eduardo Thorne MD  2022  10:57 AM

## 2022-08-30 NOTE — PROGRESS NOTES
Provided patient education about and explained the importance of medication compliance. Patient stated that he can afford the antiplatelet medication prescribed. Patient was advised that if the antiplatelet medication becomes unaffordable, he must notify the cardiologist immediately so that an alternate plan for antiplatelet therapy can be made. Patient stated that he has been taking clopidogrel and has some at home so that it will be available for the next scheduled dose after discharge. Patient asked appropriate questions and verbalized understanding during this consultation and was given printed teaching materials and my contact information in case I can provide further assistance. Spoke with patient about phase 2 outpatient cardiac rehab. Patient was given a choice of facilities to be enrolled and chose [place] [address]. Patient's information was forwarded to this facility and patient will be contacted by this facility to schedule an initial appointment. Patient was made aware of this verbally and in writing. Patient verbalized understanding and was given printed teaching materials and my contact information in case he needs further assistance, and the address and phone number for the cardiac rehab facility to which he was referred.

## 2022-08-30 NOTE — PROGRESS NOTES
Progress Note      8/30/2022 12:45 PM  NAME: Ami Reed. MRN:  [id number]   Admit Diagnosis: NSTEMI, initial episode of care [facility]) [I21.4]      Problem List:   Acute non-Q wave MI-->PCI  of OM1 yesterday  H/o inferior wall MI  CAD status post PCI of RCA and LAD 2008 and 2013  Last stent placed 10/9/2020 with Issa stent to the mid and distal RCA  Nicotine/tobacco addiction  Hyperlipidemia  Hypertension     Assessment/Plan:   DAPT for at least 12 months  Aspirin therapy lifelong  Smoking cessation discussed, patient agreeable to trying Chantix  Outpatient cardiac rehab recommended  Follow-up with primary cardiologist in 1 to 2 weeks         []       High complexity decision making was performed in this patient at high risk for decompensation with multiple organ involvement. Subjective:     Ami Reed. denies chest pain, dyspnea. Discussed with RN events overnight. Review of Systems:   Negative except for as noted above. Objective:      Physical Exam:    Last 24hrs VS reviewed since prior progress note. Most recent are:    Visit Vitals  BP (!) 155/76 (BP 1 Location: Right upper arm, BP Patient Position: Sitting)   Pulse 66   Temp 98.2 °F (36.8 °C)   Resp 18   Ht 6' 1\" (1.854 m)   Wt 115.7 kg (255 lb)   SpO2 98%   BMI 33.64 kg/m²     No intake or output data in the 24 hours ending 08/30/22 1245     General Appearance: Alert; no acute distress. Ears/Nose/Mouth/Throat: moist mucous membranes  Neck: Supple. Chest: Lungs clear to auscultation bilaterally. Cardiovascular: Regular rate and rhythm, S1S2 normal  Abdomen: Soft, non-tender, bowel sounds are active. Extremities: No edema bilaterally. Skin: Warm and dry. []         Post-cath site without hematoma, bruit, tenderness, or thrill. Distal pulses intact.     PMH/SH reviewed - no change compared to H&P    Telemetry: NSR    EKG: NSR with old IMI    08/27/22    ECHO ADULT COMPLETE 08/28/2022 8/28/2022    Interpretation Summary  Formatting of this result is different from the original.      Left Ventricle: Mildly reduced left ventricular systolic function with a visually estimated EF of 45 - 50%. Left ventricle size is normal. Mildly increased wall thickness. Findings consistent with concentric hypertrophy. Moderate hypokinesis of the following segments: basal inferior, basal inferolateral, mid inferior and apical inferior. Right Ventricle: Not well visualized. Right ventricle size is normal.    Mitral Valve: Mild regurgitation. Signed by: Mil Arvizu MD on 8/28/2022  3:24 PM      []  No new EKG for review    Lab Data Personally Reviewed:    Recent Labs     08/27/22  2224 08/27/22  1830   WBC 9.9 9.6   HGB 14.4 15.0   HCT 41.7 44.6    237     Recent Labs     08/29/22  1250 08/29/22  0531 08/28/22  2312   APTT 80.8* 93.9* 97.4*      Recent Labs     08/28/22  0459 08/27/22  1830    140   K 3.8 3.3*   * 104   CO2 26 26   BUN 11 15   CREA 0.52* 0.78   GLU 93 155*   CA 8.8 9.1     Recent Labs     08/28/22  0459        Lab Results   Component Value Date/Time    Cholesterol, total 180 10/05/2020 03:15 PM    HDL Cholesterol 43 10/05/2020 03:15 PM    LDL, calculated 81 10/05/2020 03:15 PM    Triglyceride 280 (H) 10/05/2020 03:15 PM    CHOL/HDL Ratio 4.2 10/05/2020 03:15 PM       Recent Labs     08/27/22  1830      TP 7.2   ALB 3.9   GLOB 3.3     No results for input(s): PH, PCO2, PO2 in the last 72 hours.     Medications Personally Reviewed:    Current Facility-Administered Medications   Medication Dose Route Frequency    atorvastatin (LIPITOR) tablet 80 mg  80 mg Oral QHS    acetaminophen-codeine (TYLENOL #3) per tablet 2 Tablet  2 Tablet Oral Q6H PRN    heparin 25,000 units in D5W 250 ml infusion  12-25 Units/kg/hr IntraVENous TITRATE    heparin (porcine) 1,000 unit/mL injection 4,000 Units  4,000 Units IntraVENous PRN    Or    heparin (porcine) 1,000 unit/mL injection 2,000 Units  2,000 Units IntraVENous PRN clopidogreL (PLAVIX) tablet 75 mg  75 mg Oral DAILY    aspirin delayed-release tablet 81 mg  81 mg Oral DAILY    losartan (COZAAR) tablet 50 mg  50 mg Oral DAILY    metoprolol succinate (TOPROL-XL) XL tablet 100 mg  100 mg Oral DAILY    sodium chloride (NS) flush 5-40 mL  5-40 mL IntraVENous Q8H    sodium chloride (NS) flush 5-40 mL  5-40 mL IntraVENous PRN    acetaminophen (TYLENOL) tablet 650 mg  650 mg Oral Q4H PRN    ondansetron (ZOFRAN ODT) tablet 4 mg  4 mg Oral Q6H PRN     Current Outpatient Medications   Medication Sig    atorvastatin (LIPITOR) 80 mg tablet Take 1 Tablet by mouth nightly for 30 days. varenicline (Chantix Starting Month Box) 0.5 mg (11)- 1 mg (42) DsPk Use as directed on the dosepak    losartan (COZAAR) 50 mg tablet     metoprolol succinate 100 mg CSpX Take 100 mg by mouth daily. clopidogreL (PLAVIX) 75 mg tab Take 75 mg by mouth daily. aspirin delayed-release 81 mg tablet Take 81 mg by mouth daily.          Carlos Hooper MD

## 2025-05-16 NOTE — Clinical Note
Initial Pulmonary/ICU/Critical Care Consultation        Reason for Consultation: lethargic and hypotensive  Referring Physician: Dr. Arcos      HPI: 67 yo male with s/p POD #1 C3-C7 laminoplasty on pain meds (last oxycodone 10 mg at 3 am), A-fib on eliquis (being held), TIA, HTN, HLP  This am was found to be hypotensive and somnolent this am s/p 1500 ml fluid bolus and IVF @ 100 ml/hr  Pt reports he didn't sleep last night  Per fiance, pt snores but never been tested for sleep apnea  He denies previous hx of smoking and not on inhalers, nebs, supp 02  Afebrile  BP meds held this am  ABG earlier showed hypercapnia started on BIPAP but pt removed now to speak with family at bedside  Was also given narcan earlier this am when he was somnolent  When seen this afternoon, he is much more awake and answering questions appropriately.   Repeat BP with SBP in mid 90s. Pt reports his BP runs on the lower side normally.      REVIEW OF SYSTEMS:  Positives and negatives as noted in HPI. All other review of systems otherwise are either limited (due to pt/family inability to provide) or negative.      PAST MEDICAL HISTORY:  Past Medical History[1]      PAST SURGICAL HISTORY:  Past Surgical History[2]      PAST SOCIAL HISTORY:  Social Hx on file[3]      PAST FAMILY HISTORY:  Family History[4]      ALLERGIES:  Allergies[5]      MEDS:  Home Medications:  Medications Taking[6]    Scheduled Medication:  Scheduled Medications[7]  Continuous Infusing Medication:  Medication Infusions[8]  PRN Medications:  PRN Medications[9]       PHYSICAL EXAM:  BP 96/52 (BP Location: Right arm)   Pulse 67   Temp 98.9 °F (37.2 °C) (Oral)   Resp 18   Ht 6' 3\" (1.905 m)   Wt (!) 309 lb (140.2 kg)   SpO2 100%   BMI 38.62 kg/m²      CONSTITUTIONAL: alert, oriented, no apparent distress  HEENT: atraumatic normocephalic  MOUTH: on BIPAP (14/5)  NECK/THROAT: no JVD. Trachea midline. No obvious thyromegaly. + drain   LUNG: clear b/l no wheezing, crackles.  Sheath #1: Sheath: removed. Chest symmetric with respiratory motion  HEART: regular rate and rhythm, no obvious murmers or gallops noted  ABD: soft non tender. + bowel sounds. No organomegaly noted  EXT: no clubbing, cyanosis, or edema noted. Pulses intact grossly  NEURO/MUSCULOSKELETAL: no gross deficits  SKIN: warm, dry. No obvious lesions noted  LYMPH: no obvious LAD      IMAGES:   No recent chest imaging to review      LABS:  Recent Labs   Lab 05/16/25  0551   RBC 3.72*   HGB 12.2*   HCT 40.6   .1*   MCH 32.8   MCHC 30.0*   RDW 14.0   WBC 7.5   .0*       Recent Labs   Lab 05/16/25  0615 05/16/25  1210   * 126*   BUN 24* 28*   CREATSERUM 1.74* 1.84*   EGFRCR 43* 40*   CA 8.5* 8.0*    138   K 5.7* 4.8    106   CO2 26.0 24.0       ASSESSMENT/PLAN:  Hypotension   -possibly secondary to meds vs hypovolemia. Less likely sepsis   -check LA  -check troponin, TTE  -monitor on tele   -continue IVF  -hold blood pressure meds and pain meds    Hx of afib  on eliquis  -eliquis being held  -check TTE  -pt on digoxin at home. Check level    Acute hypercapnic respiratory failure  -possibly d/t pain meds and underlying LESLY?  -started on BIPAP now weaned off. Pt more alert. Can remove BIPAP  -continue PRN BIPAP and at night time/with naps as concern for LESLY   -outpt PSG    S/p POD c3-c7 laminoplasty  -ortho following  -minimize pain meds    FERNANDA on CKD?  -IVF  -monitor renal function and UO    Proph  -DVT: SCDs    Dispo  -full code  -hold transfer to ICU for now and see how he responds to another bolus IVF    Thank you for the opportunity to care for Reynaldo Mitch Rodriguez DO, MPH  Pulmonary Critical Care Medicine  [facility]                         [1]   Past Medical History:  Diagnosis Date    Arrhythmia     A.Fib    Atrial fibrillation (HCC)     Calculus of kidney     Depression     Disorder of thyroid     Essential hypertension     Gout     High blood pressure      High cholesterol     Hyperlipidemia     Neuropathy     bilateral feet    Numbness and tingling in both hands     Osteoarthritis     Stroke (HCC) 11/18/2024    mini stroke-    Visual impairment     eyeglasses   [2]   Past Surgical History:  Procedure Laterality Date    Anesth,achilles tendon surg Bilateral     Appendectomy      Colonoscopy      Fracture surgery Left     wrist    Gastric bypass,obesity,sb reconstruc  2000    Total knee replacement Bilateral 2021    Total shoulder arthroplasty Right 07/13/2022    Right total shoulder arthroplasty --Christiano   [3]   Social History  Socioeconomic History    Marital status:    Tobacco Use    Smoking status: Never    Smokeless tobacco: Never   Vaping Use    Vaping status: Never Used   Substance and Sexual Activity    Alcohol use: Yes     Alcohol/week: 2.0 standard drinks of alcohol     Types: 2 Standard drinks or equivalent per week    Drug use: Never   [4]   Family History  Problem Relation Age of Onset    Hypertension Father     Heart Disorder Father     Hypertension Mother    [5]   Allergies  Allergen Reactions    Ancef [Cefazolin] RENAL INSUFFICIENCY     Interstitial nephritis    Vancomycin HYPOTENSION     Per patient, he tolerated last treatment with Vancomycin.    Tigecycline FACE FLUSHING    Naproxen UNKNOWN     Patient cannot take d/t kidney dse.     Clindamycin RASH    Daptomycin RASH     Tolerating course of dapto in 8/2022   [6]   Outpatient Medications Marked as Taking for the 5/15/25 encounter (Hospital Encounter)   Medication Sig Dispense Refill    valsartan 80 MG Oral Tab Take 1 tablet (80 mg total) by mouth daily.      gabapentin 100 MG Oral Cap Take 3 capsules (300 mg total) by mouth 2 (two) times daily.      metoprolol succinate ER 50 MG Oral Tablet 24 Hr Take 1 tablet (50 mg total) by mouth daily.      mirtazapine 15 MG Oral Tab Take 1 tablet (15 mg total) by mouth nightly.      buPROPion  MG Oral Tablet 12 Hr Take 1 tablet (150 mg  total) by mouth daily.      torsemide 10 MG Oral Tab Take 1 tablet (10 mg total) by mouth daily.      Omega-3 1000 MG Oral Cap Take 2,400 mg by mouth daily.      enoxaparin 150 MG/ML Injection Solution Prefilled Syringe Inject 1.5 mg/kg into the skin every 12 (twelve) hours.      Fenofibrate 160 MG Oral Tab Take 1 tablet (160 mg total) by mouth daily.      Multiple Vitamin (ONE-DAILY MULTI VITAMINS) Oral Tab Take 1 tablet by mouth daily.      allopurinol 300 MG Oral Tab Take 1 tablet (300 mg total) by mouth in the morning.      digoxin 0.25 MG Oral Tab Take 1 tablet (0.25 mg total) by mouth in the morning.      atorvastatin 10 MG Oral Tab Take 1 tablet (10 mg total) by mouth nightly.      ELIQUIS 5 MG Oral Tab Take 1 tablet (5 mg total) by mouth in the morning and 1 tablet (5 mg total) before bedtime.      Levothyroxine Sodium 50 MCG Oral Tab Take 2 tablets (100 mcg total) by mouth before breakfast.     [7]    sennosides  17.2 mg Oral Nightly    docusate sodium  100 mg Oral BID    allopurinol  300 mg Oral Daily    atorvastatin  10 mg Oral Nightly    buPROPion SR  150 mg Oral Daily    digoxin  0.25 mg Oral Daily    gabapentin  300 mg Oral BID    levothyroxine  100 mcg Oral Before breakfast    [Held by provider] metoprolol succinate ER  50 mg Oral Daily    mirtazapine  15 mg Oral Nightly    [Held by provider] torsemide  10 mg Oral Daily    [Held by provider] losartan  100 mg Oral Daily   [8]    sodium chloride 100 mL/hr at 05/16/25 1330    lactated ringers Stopped (05/15/25 1608)   [9]   acetaminophen    sodium chloride    polyethylene glycol (PEG 3350)    magnesium hydroxide    bisacodyl    fleet enema    ondansetron    metoclopramide    diphenhydrAMINE **OR** diphenhydrAMINE    benzocaine-menthol    oxyCODONE **OR** oxyCODONE    HYDROmorphone **OR** HYDROmorphone    tiZANidine    diazePAM

## (undated) DEVICE — SYRINGE ANGIO 20ML WHT POLYCARB VAC PRSS CAP PLUNG FIX M

## (undated) DEVICE — ADAPTER IV TBNG CLR POLYCARB DBL M LUERLOCK

## (undated) DEVICE — Device

## (undated) DEVICE — LULU RADIAL/FEMORAL ANGIOGRAPHY SHEET: Brand: CONVERTORS

## (undated) DEVICE — SURGICAL PROCEDURE TRAY CRD CATH 3 PRT

## (undated) DEVICE — RADIFOCUS OPTITORQUE ANGIOGRAPHIC CATHETER: Brand: OPTITORQUE

## (undated) DEVICE — PRESSURE TUBING: Brand: TRUWAVE

## (undated) DEVICE — SYRINGE MED 10ML RED POLYCARB BRL FIX M LUER CONN FLAT GRP

## (undated) DEVICE — GLIDESHEATH SLENDER ACCESS KIT: Brand: GLIDESHEATH SLENDER

## (undated) DEVICE — TOOL INSRT ANGI GDWIRE MTL SS --

## (undated) DEVICE — DEVICE TORQ FLRESCNT PNK FOR HEMSTAS VLV

## (undated) DEVICE — GUIDEWIRE VASC L260CM DIA0.035IN TIP L3MM STD EXCHG PTFE J

## (undated) DEVICE — RUNTHROUGH NS EXTRA FLOPPY PTCA GUIDEWIRE: Brand: RUNTHROUGH

## (undated) DEVICE — SURSITE 4 X 4.8  MED MSC2705Z

## (undated) DEVICE — DEVICE INFL SYR BLLN ENDO 30 -- INTELLI

## (undated) DEVICE — CATHETER ANGIO L100CM OD6FR AD JUDKINS L 5 COR VASC W/O

## (undated) DEVICE — CATHETER ANGIO JL4 0.045 INX5 FRX100 CM THRULUMEN EXPO

## (undated) DEVICE — CATH BLLN DIL 2.5 X15MM RX -- EUPHORA

## (undated) DEVICE — VALVE HEMSTAS 9FR POLYCARB L BOR DBL Y ACCS +

## (undated) DEVICE — CATH GUID COR EB35 6FR 100CM -- LAUNCHER

## (undated) DEVICE — COPILOT BLEEDBACK CONTROL VALVE: Brand: COPILOT

## (undated) DEVICE — GUIDEWIRE VASC L260CM DIA0.035IN RAD 3MM J TIP L7CM PTFE

## (undated) DEVICE — CATHETER ANGIO 5FR L100CM GRY S STL NYL JL3.5 3 SEG BRAID L

## (undated) DEVICE — PTCA DILATATION CATHETER: Brand: EMERGE™

## (undated) DEVICE — TUBING PRESS INJ FLX SH 30IN --

## (undated) DEVICE — WASTEBAG DRIP/ADAPTER: Brand: MEDLINE INDUSTRIES, INC.

## (undated) DEVICE — BAND COMPR L29CM XLN CLR PLAS HEMSTAT EXT HK AND LOOP RETEN

## (undated) DEVICE — 3M™ TEGADERM™ TRANSPARENT FILM DRESSING FRAME STYLE, 1626W, 4 IN X 4-3/4 IN (10 CM X 12 CM), 50/CT 4CT/CASE: Brand: 3M™ TEGADERM™

## (undated) DEVICE — CATH 6F 100CM JR40 -- DXTERITY

## (undated) DEVICE — CATHETER GUID 6FR L100CM GRN PTFE JR4 TRUELUMEN HYBRID

## (undated) DEVICE — BOWL UTIL 16OZ STRL --

## (undated) DEVICE — BAND RADIAL COMPR ARTERY 24CM -- REG BX/10

## (undated) DEVICE — SYRINGE ANGIO 10 CC POLYCARB DK GRN MEDALLION DISP

## (undated) DEVICE — TR BAND RADIAL ARTERY COMPRESSION DEVICE: Brand: TR BAND

## (undated) DEVICE — CATHETER ANGIO 5FR L100CM GRY S STL NYL JR4 3 SEG BRAID L